# Patient Record
Sex: FEMALE | Race: WHITE | NOT HISPANIC OR LATINO | Employment: UNEMPLOYED | URBAN - METROPOLITAN AREA
[De-identification: names, ages, dates, MRNs, and addresses within clinical notes are randomized per-mention and may not be internally consistent; named-entity substitution may affect disease eponyms.]

---

## 2017-02-04 ENCOUNTER — APPOINTMENT (OUTPATIENT)
Dept: GENERAL RADIOLOGY | Facility: HOSPITAL | Age: 57
End: 2017-02-04

## 2017-02-04 PROCEDURE — 73610 X-RAY EXAM OF ANKLE: CPT | Performed by: FAMILY MEDICINE

## 2018-11-03 ENCOUNTER — HOSPITAL ENCOUNTER (EMERGENCY)
Facility: HOSPITAL | Age: 58
Discharge: HOME | End: 2018-11-04
Attending: EMERGENCY MEDICINE | Admitting: EMERGENCY MEDICINE

## 2018-11-03 DIAGNOSIS — F41.0 PANIC ATTACK: Primary | ICD-10-CM

## 2018-11-03 PROCEDURE — 93005 ELECTROCARDIOGRAM TRACING: CPT | Performed by: EMERGENCY MEDICINE

## 2018-11-03 PROCEDURE — 93010 ELECTROCARDIOGRAM REPORT: CPT | Performed by: INTERNAL MEDICINE

## 2018-11-03 PROCEDURE — 93005 ELECTROCARDIOGRAM TRACING: CPT

## 2018-11-03 PROCEDURE — 99284 EMERGENCY DEPT VISIT MOD MDM: CPT

## 2018-11-03 RX ORDER — SODIUM CHLORIDE 0.9 % (FLUSH) 0.9 %
10 SYRINGE (ML) INJECTION AS NEEDED
Status: DISCONTINUED | OUTPATIENT
Start: 2018-11-03 | End: 2018-11-04 | Stop reason: HOSPADM

## 2018-11-03 RX ORDER — LORAZEPAM 1 MG/1
1 TABLET ORAL ONCE
Status: COMPLETED | OUTPATIENT
Start: 2018-11-03 | End: 2018-11-04

## 2018-11-04 ENCOUNTER — APPOINTMENT (OUTPATIENT)
Dept: GENERAL RADIOLOGY | Facility: HOSPITAL | Age: 58
End: 2018-11-04

## 2018-11-04 VITALS
HEART RATE: 85 BPM | DIASTOLIC BLOOD PRESSURE: 75 MMHG | RESPIRATION RATE: 16 BRPM | OXYGEN SATURATION: 97 % | BODY MASS INDEX: 38.11 KG/M2 | SYSTOLIC BLOOD PRESSURE: 118 MMHG | TEMPERATURE: 97.4 F | HEIGHT: 66 IN | WEIGHT: 237.1 LBS

## 2018-11-04 LAB
ALBUMIN SERPL-MCNC: 4.6 G/DL (ref 3.5–5.2)
ALBUMIN/GLOB SERPL: 1.5 G/DL
ALP SERPL-CCNC: 80 U/L (ref 39–117)
ALT SERPL W P-5'-P-CCNC: 16 U/L (ref 1–33)
ANION GAP SERPL CALCULATED.3IONS-SCNC: 12.4 MMOL/L
AST SERPL-CCNC: 13 U/L (ref 1–32)
BASOPHILS # BLD AUTO: 0.08 10*3/MM3 (ref 0–0.2)
BASOPHILS NFR BLD AUTO: 1 % (ref 0–1.5)
BILIRUB SERPL-MCNC: 0.4 MG/DL (ref 0.1–1.2)
BUN BLD-MCNC: 19 MG/DL (ref 6–20)
BUN/CREAT SERPL: 19.2 (ref 7–25)
CALCIUM SPEC-SCNC: 9.4 MG/DL (ref 8.6–10.5)
CHLORIDE SERPL-SCNC: 103 MMOL/L (ref 98–107)
CO2 SERPL-SCNC: 24.6 MMOL/L (ref 22–29)
CREAT BLD-MCNC: 0.99 MG/DL (ref 0.57–1)
D DIMER PPP FEU-MCNC: <0.27 MCGFEU/ML (ref 0–0.49)
DEPRECATED RDW RBC AUTO: 44.3 FL (ref 37–54)
EOSINOPHIL # BLD AUTO: 0.2 10*3/MM3 (ref 0–0.7)
EOSINOPHIL NFR BLD AUTO: 2.5 % (ref 0.3–6.2)
ERYTHROCYTE [DISTWIDTH] IN BLOOD BY AUTOMATED COUNT: 13.5 % (ref 11.7–13)
GFR SERPL CREATININE-BSD FRML MDRD: 58 ML/MIN/1.73
GLOBULIN UR ELPH-MCNC: 3 GM/DL
GLUCOSE BLD-MCNC: 102 MG/DL (ref 65–99)
HCT VFR BLD AUTO: 41.8 % (ref 35.6–45.5)
HGB BLD-MCNC: 13.8 G/DL (ref 11.9–15.5)
HOLD SPECIMEN: NORMAL
IMM GRANULOCYTES # BLD: 0 10*3/MM3 (ref 0–0.03)
IMM GRANULOCYTES NFR BLD: 0 % (ref 0–0.5)
INR PPP: 1.02 (ref 0.9–1.1)
LYMPHOCYTES # BLD AUTO: 3.5 10*3/MM3 (ref 0.9–4.8)
LYMPHOCYTES NFR BLD AUTO: 43.1 % (ref 19.6–45.3)
MCH RBC QN AUTO: 30 PG (ref 26.9–32)
MCHC RBC AUTO-ENTMCNC: 33 G/DL (ref 32.4–36.3)
MCV RBC AUTO: 90.9 FL (ref 80.5–98.2)
MONOCYTES # BLD AUTO: 0.62 10*3/MM3 (ref 0.2–1.2)
MONOCYTES NFR BLD AUTO: 7.6 % (ref 5–12)
NEUTROPHILS # BLD AUTO: 3.72 10*3/MM3 (ref 1.9–8.1)
NEUTROPHILS NFR BLD AUTO: 45.8 % (ref 42.7–76)
NRBC BLD MANUAL-RTO: 0 /100 WBC (ref 0–0)
PLATELET # BLD AUTO: 315 10*3/MM3 (ref 140–500)
PMV BLD AUTO: 9.4 FL (ref 6–12)
POTASSIUM BLD-SCNC: 4.4 MMOL/L (ref 3.5–5.2)
PROT SERPL-MCNC: 7.6 G/DL (ref 6–8.5)
PROTHROMBIN TIME: 13.2 SECONDS (ref 11.7–14.2)
RBC # BLD AUTO: 4.6 10*6/MM3 (ref 3.9–5.2)
SODIUM BLD-SCNC: 140 MMOL/L (ref 136–145)
TROPONIN T SERPL-MCNC: <0.01 NG/ML (ref 0–0.03)
WBC NRBC COR # BLD: 8.12 10*3/MM3 (ref 4.5–10.7)
WHOLE BLOOD HOLD SPECIMEN: NORMAL

## 2018-11-04 PROCEDURE — 80053 COMPREHEN METABOLIC PANEL: CPT | Performed by: EMERGENCY MEDICINE

## 2018-11-04 PROCEDURE — 85610 PROTHROMBIN TIME: CPT | Performed by: EMERGENCY MEDICINE

## 2018-11-04 PROCEDURE — 85379 FIBRIN DEGRADATION QUANT: CPT | Performed by: EMERGENCY MEDICINE

## 2018-11-04 PROCEDURE — 71046 X-RAY EXAM CHEST 2 VIEWS: CPT

## 2018-11-04 PROCEDURE — 90791 PSYCH DIAGNOSTIC EVALUATION: CPT

## 2018-11-04 PROCEDURE — 84484 ASSAY OF TROPONIN QUANT: CPT | Performed by: EMERGENCY MEDICINE

## 2018-11-04 PROCEDURE — 85025 COMPLETE CBC W/AUTO DIFF WBC: CPT | Performed by: EMERGENCY MEDICINE

## 2018-11-04 RX ADMIN — LORAZEPAM 1 MG: 1 TABLET ORAL at 00:24
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
## 2018-11-04 NOTE — ED PROVIDER NOTES
EMERGENCY DEPARTMENT ENCOUNTER    CHIEF COMPLAINT  Chief Complaint: SOA  History given by: Patient  History limited by: None  Room Number: HALD/D  PMD: Kari Fernandez, APRN      HPI:  Pt is a 58 y.o. female who presents via EMS complaining an episode of SOA which lasted at lasted 30 minutes and occurred PTA. Pt notes she had a virus a few weeks ago and that she recently quit smoking, but she denies having been diagnosed with COPD. Pt also notes hx of anxiety for which she takes Cymbalta and trazodone, that she has been anxious due to recent stress, and that her sx today reminded her of previously experienced anxiety sx.    Duration:  30 minutes  Onset: Sudden  Timing: One episode  Location: N/A  Radiation: N/A  Quality: SOA  Intensity/Severity: Moderate  Progression: Relieved  Associated Symptoms: Anxiety  Aggravating Factors: None specified  Alleviating Factors: None specified  Previous Episodes: Pt also notes hx of anxiety for which she takes Cymbalta and trazodone and that her sx today reminded her of previous anxiety sx.  Treatment before arrival: None    PAST MEDICAL HISTORY  Active Ambulatory Problems     Diagnosis Date Noted   • No Active Ambulatory Problems     Resolved Ambulatory Problems     Diagnosis Date Noted   • No Resolved Ambulatory Problems     Past Medical History:   Diagnosis Date   • Anxiety    • PTSD (post-traumatic stress disorder)        PAST SURGICAL HISTORY  Past Surgical History:   Procedure Laterality Date   • BACK SURGERY         FAMILY HISTORY  Family History   Problem Relation Age of Onset   • Anxiety disorder Mother    • Anxiety disorder Sister    • Anxiety disorder Maternal Grandmother    • Bipolar disorder Son    • Drug abuse Son         heroin    • Bipolar disorder Daughter    • Drug abuse Daughter         heroin   • Bipolar disorder Daughter    • Drug abuse Daughter         heroin       SOCIAL HISTORY  Social History     Social History   • Marital status: Single     Spouse name:  N/A   • Number of children: N/A   • Years of education: N/A     Occupational History   • Not on file.     Social History Main Topics   • Smoking status: Never Smoker   • Smokeless tobacco: Never Used   • Alcohol use No   • Drug use: No   • Sexual activity: Defer     Other Topics Concern   • Not on file     Social History Narrative   • No narrative on file       ALLERGIES  Patient has no known allergies.    REVIEW OF SYSTEMS  Review of Systems   Constitutional: Negative for fever.   HENT: Negative for sore throat.    Eyes: Negative.    Respiratory: Positive for shortness of breath (1 episode). Negative for cough.    Cardiovascular: Negative for chest pain.   Gastrointestinal: Negative for abdominal pain, diarrhea and vomiting.   Genitourinary: Negative for dysuria.   Musculoskeletal: Negative for neck pain.   Skin: Negative for rash.   Allergic/Immunologic: Negative.    Neurological: Negative for weakness, numbness and headaches.   Hematological: Negative.    Psychiatric/Behavioral: The patient is nervous/anxious.    All other systems reviewed and are negative.      PHYSICAL EXAM  ED Triage Vitals   Temp Heart Rate Resp BP SpO2   11/03/18 2120 11/03/18 2120 11/03/18 2120 11/03/18 2120 11/03/18 2120   97.4 °F (36.3 °C) 81 17 120/80 91 %      Temp src Heart Rate Source Patient Position BP Location FiO2 (%)   11/03/18 2120 11/03/18 2322 11/03/18 2322 11/03/18 2322 --   Tympanic Monitor Lying Right arm        Physical Exam   Constitutional: She is oriented to person, place, and time. No distress.   HENT:   Head: Normocephalic and atraumatic.   Eyes: Pupils are equal, round, and reactive to light. EOM are normal.   Neck: Normal range of motion. Neck supple.   Cardiovascular: Normal rate, regular rhythm and normal heart sounds.    Pulmonary/Chest: Effort normal and breath sounds normal. No respiratory distress.   Abdominal: Soft. There is no tenderness. There is no rebound and no guarding.   Musculoskeletal: Normal range  of motion. She exhibits no edema.   Neurological: She is alert and oriented to person, place, and time. She has normal sensation and normal strength.   Skin: Skin is warm and dry. No rash noted.   Psychiatric: Affect normal. Her mood appears anxious (mildly).   Nursing note and vitals reviewed.    LAB RESULTS  Lab Results (last 24 hours)     Procedure Component Value Units Date/Time    CBC & Differential [[id number]] Collected:  11/04/18 0016    Specimen:  Blood Updated:  11/04/18 0037    Narrative:       The following orders were created for panel order CBC & Differential.  Procedure                               Abnormality         Status                     ---------                               -----------         ------                     CBC Auto Differential[[id number]]         Abnormal            Final result                 Please view results for these tests on the individual orders.    Comprehensive Metabolic Panel [[id number]]  (Abnormal) Collected:  11/04/18 0016    Specimen:  Blood Updated:  11/04/18 0046     Glucose 102 (H) mg/dL      BUN 19 mg/dL      Creatinine 0.99 mg/dL      Sodium 140 mmol/L      Potassium 4.4 mmol/L      Chloride 103 mmol/L      CO2 24.6 mmol/L      Calcium 9.4 mg/dL      Total Protein 7.6 g/dL      Albumin 4.60 g/dL      ALT (SGPT) 16 U/L      AST (SGOT) 13 U/L      Alkaline Phosphatase 80 U/L      Total Bilirubin 0.4 mg/dL      eGFR Non African Amer 58 (L) mL/min/1.73      Globulin 3.0 gm/dL      A/G Ratio 1.5 g/dL      BUN/Creatinine Ratio 19.2     Anion Gap 12.4 mmol/L     Protime-INR [[id number]]  (Normal) Collected:  11/04/18 0016    Specimen:  Blood Updated:  11/04/18 0108     Protime 13.2 Seconds      INR 1.02    D-dimer, Quantitative [[id number]]  (Normal) Collected:  11/04/18 0016    Specimen:  Blood Updated:  11/04/18 0115     D-Dimer, Quantitative <0.27 MCGFEU/mL     Narrative:       The Stago D-Dimer test used in conjunction with a clinical pretest probability (PTP)  assessment model, has been approved by the FDA to rule out the presence of venous thromboembolism (VTE) in outpatients suspected of deep venous thrombosis (DVT) or pulmonary embolism (PE).     Troponin [[id number]]  (Normal) Collected:  11/04/18 0016    Specimen:  Blood Updated:  11/04/18 0046     Troponin T <0.010 ng/mL     Narrative:       Troponin T Reference Ranges:  Less than 0.03 ng/mL:    Negative for AMI  0.03 to 0.09 ng/mL:      Indeterminant for AMI  Greater than 0.09 ng/mL: Positive for AMI    CBC Auto Differential [[id number]]  (Abnormal) Collected:  11/04/18 0016    Specimen:  Blood Updated:  11/04/18 0037     WBC 8.12 10*3/mm3      RBC 4.60 10*6/mm3      Hemoglobin 13.8 g/dL      Hematocrit 41.8 %      MCV 90.9 fL      MCH 30.0 pg      MCHC 33.0 g/dL      RDW 13.5 (H) %      RDW-SD 44.3 fl      MPV 9.4 fL      Platelets 315 10*3/mm3      Neutrophil % 45.8 %      Lymphocyte % 43.1 %      Monocyte % 7.6 %      Eosinophil % 2.5 %      Basophil % 1.0 %      Immature Grans % 0.0 %      Neutrophils, Absolute 3.72 10*3/mm3      Lymphocytes, Absolute 3.50 10*3/mm3      Monocytes, Absolute 0.62 10*3/mm3      Eosinophils, Absolute 0.20 10*3/mm3      Basophils, Absolute 0.08 10*3/mm3      Immature Grans, Absolute 0.00 10*3/mm3      nRBC 0.0 /100 WBC         I ordered the above labs and reviewed the results    RADIOLOGY  XR Chest 2 View   Final Result   Left basilar atelectasis versus scarring. No definite acute infiltrates.       This report was finalized on 11/4/2018 12:36 AM by Dr. Carla Almeida M.D.             I ordered the above noted radiological studies. Interpreted by radiologist. Reviewed by me in PACS.     PROCEDURES  Procedures  EKG          EKG time: 2133  Rhythm/Rate: Sinus 76  P waves and HI: NML  QRS, axis: NML   ST and T waves: NML     Interpreted Contemporaneously by me, independently viewed  No old for comparison    PROGRESS AND CONSULTS   2350-Checked patient and discussed plan to order blood  work, EKG, and CXR to rule out pneumonia/heart issue and to order antivan to calm her. Pt understands and agrees with the plan, all questions answered.    2355-ordered Ativan, CXR, blood work, and EKG.    0115-Ordered Access Evaluation.    (DST change)    0121-Discussed pt's case with Access who feels comfortable with discharge and instrcutions to follow up with therapist and PCP. Per Access, pt reports that Xanax has worked in the past for her anxiety.     0145-Rechecked patinet and discussed results of her blood work and EKG which are normal and liklihood of anxiety of the source of her sx tonight. I discussed plan to discharge with instructions to follow up with her PCP. I agreed to write her px for Xanax. Pt understands and agrees with the plan, all questions answered.    MEDICAL DECISION MAKING  Results were reviewed/discussed with the patient and they were also made aware of online access. Pt also made aware that some labs, such as cultures, will not be resulted during ER visit and follow up with PMD is necessary.     MDM  Number of Diagnoses or Management Options  Panic attack:      Amount and/or Complexity of Data Reviewed  Clinical lab tests: ordered and reviewed (Troponin is <0.010, d-dimer<0.27, protime=13.2, INR=1.02)  Tests in the radiology section of CPT®: reviewed and ordered (CXR shows Left basilar atelectasis versus scarring but no definite acute infiltrates)  Tests in the medicine section of CPT®: reviewed and ordered (See EKG procedure note.)    Patient Progress  Patient progress: stable     DIAGNOSIS  Final diagnoses:   Panic attack     DISPOSITION  DISCHARGE    Patient discharged in stable condition.    Reviewed implications of results, diagnosis, meds, responsibility to follow up, warning signs and symptoms of possible worsening, potential complications and reasons to return to ER.    Patient/Family voiced understanding of above instructions.    Discussed plan for discharge, as there is no  emergent indication for admission. Patient referred to primary care provider for BP management due to today's BP. Pt/family is agreeable and understands need for follow up and repeat testing.  Pt is aware that discharge does not mean that nothing is wrong but it indicates no emergency is present that requires admission and they must continue care with follow-up as given below or physician of their choice.     FOLLOW-UP  Kari Fernandez, APRN  [address]  [facility] [address] [phone]    Call            Medication List      No changes were made to your prescriptions during this visit.       Latest Documented Vital Signs:  As of 4:35 AM  BP- 118/75 HR- 85 Temp- 97.4 °F (36.3 °C) (Oral) O2 sat- 97%    --  Documentation assistance provided by janette Strauss for Dr. Riddle.  Information recorded by the scribe was done at my direction and has been verified and validated by me.           Sayra Strauss  11/04/18 0485       Abdelrahman Riddle MD  11/04/18 0508

## 2020-08-12 ENCOUNTER — TELEPHONE (OUTPATIENT)
Dept: FAMILY MEDICINE CLINIC | Facility: CLINIC | Age: 60
End: 2020-08-12

## 2020-08-12 NOTE — TELEPHONE ENCOUNTER
PATIENT WAS SEEN AT [facility] ER ABOUT 3 WEEKS AGO BECAUSE SHE COULD NOT MOVE HER ARMS, MORE IN THE LEFT ARM.      SHE WAS TOLD THAT HER NERVE HOLES ARE CLOSING ON HER SPINE. SHE HAS HAD TWO SURGERIES ON HER RUPTURED DISCS.    PLEASE CALL [phone]    NEEDS NEW PATIENT APPOINTMENT ER FOLLOW UP

## 2020-08-20 ENCOUNTER — OFFICE VISIT (OUTPATIENT)
Dept: FAMILY MEDICINE CLINIC | Facility: CLINIC | Age: 60
End: 2020-08-20

## 2020-08-20 VITALS
HEIGHT: 66 IN | DIASTOLIC BLOOD PRESSURE: 90 MMHG | BODY MASS INDEX: 36.48 KG/M2 | SYSTOLIC BLOOD PRESSURE: 140 MMHG | WEIGHT: 227 LBS | OXYGEN SATURATION: 99 % | HEART RATE: 79 BPM | TEMPERATURE: 98.2 F

## 2020-08-20 DIAGNOSIS — M79.2 CERVICAL SPINE ARTHRITIS WITH NERVE PAIN: Primary | ICD-10-CM

## 2020-08-20 DIAGNOSIS — I10 ESSENTIAL HYPERTENSION: ICD-10-CM

## 2020-08-20 DIAGNOSIS — M47.812 CERVICAL SPINE ARTHRITIS WITH NERVE PAIN: Primary | ICD-10-CM

## 2020-08-20 DIAGNOSIS — M25.512 ACUTE PAIN OF LEFT SHOULDER: ICD-10-CM

## 2020-08-20 PROCEDURE — 99203 OFFICE O/P NEW LOW 30 MIN: CPT | Performed by: INTERNAL MEDICINE

## 2020-08-20 PROCEDURE — 73030 X-RAY EXAM OF SHOULDER: CPT | Performed by: INTERNAL MEDICINE

## 2020-08-20 RX ORDER — LISINOPRIL 20 MG/1
20 TABLET ORAL DAILY
Qty: 90 TABLET | Refills: 1 | Status: SHIPPED | OUTPATIENT
Start: 2020-08-20 | End: 2021-05-13

## 2020-08-20 RX ORDER — PANTOPRAZOLE SODIUM 20 MG/1
20 TABLET, DELAYED RELEASE ORAL DAILY
Qty: 90 TABLET | Refills: 3 | Status: SHIPPED | OUTPATIENT
Start: 2020-08-20 | End: 2021-05-13

## 2020-08-20 RX ORDER — PREDNISONE 10 MG/1
TABLET ORAL DAILY
Qty: 21 TABLET | Refills: 0 | Status: SHIPPED | OUTPATIENT
Start: 2020-08-20 | End: 2020-09-11

## 2020-08-20 NOTE — PROGRESS NOTES
Subjective   Anjana Smith is a 60 y.o. female.   Body mass index is 36.64 kg/m².  Patient here to follow-up from recent ER visit with cervical spine pain with receiving CT of neck obtained.  Increasing pain does have numbness tingling in right arm.  Increased pain with movement was seen at [facility] I do not have those films available also these establish care with physician  History of Present Illness   Increasing pain in the neck and the right shoulder over the last week or 2 no specific injury was seen in [facility] ER with imaging of neck obtain do not have access at this point but pain with persist somewhat limited range of motion of neck as well.  Also is having pain in the shoulder no injury with that does have decreased range of motion we will proceed with x-ray of the shoulder as well there is no history of shoulder injury with patient or frozen shoulder does have a history of hypertension as well blood pressure is 140/90 she had been taking 10 mg lisinopril a day had been missed few days but she says it normally runs about that range anyway we will increase lisinopril to 20 mg have patient call in results in 1 week or so no other specific complaints has had prior neck surgery as well as lumbar surgery  Patient without drug allergies non-smoker.  Family history positive for anxiety disorder bipolar disorder drug abuse..  There is a personal history of PTSD the patient is doing okay now.  Does complain of weakness in the right hand and arm is dropping things.  Review of Systems   Musculoskeletal:        Neck right shoulder right arm pain see present illness   All other systems reviewed and are negative.      Objective   Vitals:    08/20/20 1311   BP: 140/90   Pulse: 79   Temp: 98.2 °F (36.8 °C)   SpO2: 99%   Weight: 103 kg (227 lb)     Physical Exam   Constitutional: She appears well-developed and well-nourished.   HENT:   Head: Normocephalic and atraumatic.   Eyes: Pupils are equal, round, and reactive  to light. Conjunctivae are normal.   Cardiovascular: Normal rate, regular rhythm and normal heart sounds.   Pulmonary/Chest: Effort normal and breath sounds normal.   Abdominal: Soft. Bowel sounds are normal.   Musculoskeletal:   Somewhat limited range of motion of neck turning neck 20 degrees to the left 4 degrees the right tilts head 20 raise left and perhaps 30 degrees the right.  Mild increased pain in the neck into the trapezius on the right with axial loading.    Regarding right shoulder mild discomfort passive range of motion moderately limited active range of motion is able to abduct to 90 degrees only.  Slightly decreased external rotation as well concerned about possible rotator cuff tear versus other.   Neurological: She is alert.   Of extreme reflexes are 1+ patient with diminished  on the right versus left.   Skin: Skin is warm and dry.   Nursing note and vitals reviewed.      Lab Results   Component Value Date    INR 1.0 11/29/2019    INR 1.0 11/29/2019    INR 1.0 08/09/2019       Procedures  X-ray right shoulder shows no acute bony or soft tissue normalities there is no comparison available.  Basically unremarkable right shoulder x-ray  Assessment/Plan   1.  Cervical spine pain suspected nerve impingement at C-spine level we will refer to neurosurgery for this trying to get the CT of the neck report from [facility]    2.  Right shoulder pain concern for possible rotator cuff tear versus other disc of x-ray to go with patient NSAIDs also regarding neck we will do prednisone 60-10 to give her some relief.    3.  Hypertension suboptimally controlled increase lisinopril to 20 g daily follow-up in 6 months call readings in 1 week's time and as needed in the interim.  Will await report from hospital in case anything else lab work consider needs to be done.      : Blood pressure readings 1 week continue on lisinopril 20 mg daily also follow-up in 6 months time and as needed   Much of this encounter  note is an electronic transcription/translation of spoken language to printed text.  The electronic translation of spoken language may permit erroneous, or at times, nonsensical words or phrases to be inadvertently transcribed.  Although I have reviewed the note for such errors, some may still exist. If there are questions or for further clarification, please contact me.

## 2020-08-28 ENCOUNTER — TELEPHONE (OUTPATIENT)
Dept: FAMILY MEDICINE CLINIC | Facility: CLINIC | Age: 60
End: 2020-08-28

## 2020-09-11 ENCOUNTER — OFFICE VISIT (OUTPATIENT)
Dept: FAMILY MEDICINE CLINIC | Facility: CLINIC | Age: 60
End: 2020-09-11

## 2020-09-11 VITALS
RESPIRATION RATE: 20 BRPM | DIASTOLIC BLOOD PRESSURE: 80 MMHG | TEMPERATURE: 98.9 F | HEIGHT: 66 IN | HEART RATE: 86 BPM | WEIGHT: 220 LBS | OXYGEN SATURATION: 99 % | SYSTOLIC BLOOD PRESSURE: 140 MMHG | BODY MASS INDEX: 35.36 KG/M2

## 2020-09-11 DIAGNOSIS — M25.561 ACUTE PAIN OF RIGHT KNEE: Primary | ICD-10-CM

## 2020-09-11 DIAGNOSIS — M54.2 CERVICAL PAIN (NECK): ICD-10-CM

## 2020-09-11 DIAGNOSIS — F41.9 ANXIETY: ICD-10-CM

## 2020-09-11 DIAGNOSIS — Z51.81 MEDICATION MONITORING ENCOUNTER: ICD-10-CM

## 2020-09-11 DIAGNOSIS — F32.A DEPRESSION, UNSPECIFIED DEPRESSION TYPE: ICD-10-CM

## 2020-09-11 PROCEDURE — 99213 OFFICE O/P EST LOW 20 MIN: CPT | Performed by: INTERNAL MEDICINE

## 2020-09-11 RX ORDER — VENLAFAXINE HYDROCHLORIDE 37.5 MG/1
CAPSULE, EXTENDED RELEASE ORAL
Qty: 60 CAPSULE | Refills: 0 | Status: SHIPPED | OUTPATIENT
Start: 2020-09-11 | End: 2021-05-13

## 2020-09-11 NOTE — PROGRESS NOTES
Subjective   Anjana Smith is a 60 y.o. female.  Increased knee pain ongoing cervical disc pain increased anxiety due to hospitalized for member among other stressors  Body mass index is 35.51 kg/m².  History of Present Illness   Patient requests gabapentin which is been effective for her cervical disc disease in the past she also would like to see pain management specifically Dr. Viraj Joseph who is seen family before no injury to need to start acting up 2 weeks ago with locking and giving a lot of pain to be going up and down steps.  But will just suddenly give suddenly from walking no thoughts of hurting self hurting others.   Review of Systems   Musculoskeletal: Positive for neck pain.   All other systems reviewed and are negative.      Objective   Vitals:    09/11/20 1405   BP: 140/80   Pulse: 86   Resp: 20   Temp: 98.9 °F (37.2 °C)   SpO2: 99%   Weight: 99.8 kg (220 lb)     Physical Exam   Constitutional: She appears well-developed and well-nourished.   HENT:   Head: Normocephalic and atraumatic.   Cardiovascular: Normal rate, regular rhythm and normal heart sounds.   Pulmonary/Chest: Effort normal and breath sounds normal.   Musculoskeletal:   Right knee with mild effusion.  Right knee is normal stressing AP lateral medial motion.  Has pain with stressing medial collateral ligament but no laxity noted minimal discomfort with doing stressing knee and AP direction.  Moderate pain with external and to lesser extent internal rotation lower leg extension immediate pain with outward turning the leg over the lateral portion of her knee.   Neurological: She is alert.   Unremarkable gait and station   Skin: Skin is warm and dry.   Psychiatric: She has a normal mood and affect. Her behavior is normal.   Patient seems somewhat serious but no tearing overall appropriate in behavior   Nursing note and vitals reviewed.      Lab Results   Component Value Date    INR 1.0 11/29/2019    INR 1.0 11/29/2019    INR 1.0  08/09/2019       Procedures    Assessment/Plan Patient had to be leave suddenly due to family member who was hospitalized will return on Monday  [facility] drug contract for probable gabapentin  There are no diagnoses linked to this encounter.     1.  Right knee pain plan follow-up will get x-ray on return    2.  Cervical disc requesting gabapentin for same    3.  Medication monitoring will need urine drug screen and consideration of gabapentin    4.  Anxiety    5.  Depression both these were again been Effexor Exar 37.51 tablet daily for 1 week then 2 tablets daily    Follow-up Monday    Much of this encounter note is an electronic transcription/translation of spoken language to printed text.  The electronic translation of spoken language may permit erroneous, or at times, nonsensical words or phrases to be inadvertently transcribed.  Although I have reviewed the note for such errors, some may still exist. If there are questions or for further clarification, please contact me.

## 2020-09-11 NOTE — TELEPHONE ENCOUNTER
Patient was here today briefly discussed patient had to leave due to family emergency.  Do to return Monday.

## 2020-09-15 ENCOUNTER — OFFICE VISIT (OUTPATIENT)
Dept: FAMILY MEDICINE CLINIC | Facility: CLINIC | Age: 60
End: 2020-09-15

## 2020-09-15 ENCOUNTER — TELEPHONE (OUTPATIENT)
Dept: FAMILY MEDICINE CLINIC | Facility: CLINIC | Age: 60
End: 2020-09-15

## 2020-09-15 VITALS
HEIGHT: 66 IN | OXYGEN SATURATION: 98 % | BODY MASS INDEX: 35.36 KG/M2 | HEART RATE: 101 BPM | WEIGHT: 220 LBS | DIASTOLIC BLOOD PRESSURE: 80 MMHG | SYSTOLIC BLOOD PRESSURE: 138 MMHG | TEMPERATURE: 98.7 F

## 2020-09-15 DIAGNOSIS — Z51.81 MEDICATION MONITORING ENCOUNTER: ICD-10-CM

## 2020-09-15 DIAGNOSIS — M25.562 ACUTE PAIN OF LEFT KNEE: Primary | ICD-10-CM

## 2020-09-15 DIAGNOSIS — M54.2 CERVICAL PAIN (NECK): ICD-10-CM

## 2020-09-15 PROCEDURE — 99213 OFFICE O/P EST LOW 20 MIN: CPT | Performed by: INTERNAL MEDICINE

## 2020-09-15 PROCEDURE — 73560 X-RAY EXAM OF KNEE 1 OR 2: CPT | Performed by: INTERNAL MEDICINE

## 2020-09-15 RX ORDER — GABAPENTIN 300 MG/1
300 CAPSULE ORAL 3 TIMES DAILY
Qty: 90 CAPSULE | Refills: 0 | Status: SHIPPED | OUTPATIENT
Start: 2020-09-15 | End: 2020-11-17 | Stop reason: SDUPTHER

## 2020-09-15 RX ORDER — VENLAFAXINE HYDROCHLORIDE 75 MG/1
CAPSULE, EXTENDED RELEASE ORAL
Qty: 30 CAPSULE | Refills: 0 | Status: SHIPPED | OUTPATIENT
Start: 2020-09-15 | End: 2021-05-13

## 2020-09-15 RX ORDER — VENLAFAXINE HYDROCHLORIDE 37.5 MG/1
37.5 CAPSULE, EXTENDED RELEASE ORAL DAILY
Qty: 7 CAPSULE | Refills: 0 | Status: SHIPPED | OUTPATIENT
Start: 2020-09-15 | End: 2021-05-13

## 2020-09-15 NOTE — TELEPHONE ENCOUNTER
Can you change rx for venlafaxine to   37.5 qd # 7 and 75 qd #30  Insurance won't pay for 37.5 2 qd

## 2020-09-15 NOTE — PROGRESS NOTES
Subjective   Anjana Smith is a 60 y.o. female.   Body mass index is 35.51 kg/m².  Patient here with ongoing right knee pain here for follow-up on same.  Did need to get x-ray obtained.  Had to leave due to hospitalized family member issue last visit.  History of Present Illness   Still with right knee discomfort not quite as bad.  He is still somewhat problematic overall is doing fair patient does request gabapentin for cervical disc pain which cause referred pain particular in the arch more so left arm we will need to get a urine drug screen on her also drug contract for gabapentin which she has had before she wishes referral to Dr. Flash Cali pain management.  Which we will initiate given the right knee issues and worry about meniscal type problem will refer to Dr. Patricia Holguin for that.  Weight pending urine blood pressure satisfactory today.  Apparently there is an issue with her Effexor probably problem relation issue otherwise she talks will try to ferret that out    Review of Systems   All other systems reviewed and are negative.      Objective   Vitals:    09/15/20 1113   BP: 138/80   Pulse: 101   Temp: 98.7 °F (37.1 °C)   SpO2: 98%   Weight: 99.8 kg (220 lb)     Physical Exam  Vitals signs and nursing note reviewed.   Constitutional:       Appearance: Normal appearance.   HENT:      Head: Normocephalic and atraumatic.   Eyes:      Conjunctiva/sclera: Conjunctivae normal.      Pupils: Pupils are equal, round, and reactive to light.   Cardiovascular:      Rate and Rhythm: Normal rate and regular rhythm.      Heart sounds: Normal heart sounds.   Pulmonary:      Effort: Pulmonary effort is normal.      Breath sounds: Normal breath sounds.   Abdominal:      General: Abdomen is flat. Bowel sounds are normal.   Musculoskeletal:      Comments: Right knee with mild effusion.  No focal tenderness palpation.  Did have discomfort with stress of being AP lateral medial motion.  Also with internal/external  rotation of the lower leg.   Skin:     General: Skin is warm and dry.   Neurological:      Mental Status: She is alert.         Lab Results   Component Value Date    INR 1.0 11/29/2019    INR 1.0 11/29/2019    INR 1.0 08/09/2019       Procedures  PA and lateral x-ray obtained right knee.  No comparison available.  Patient does have evidence of fusion knee seen on lateral view.  Mild narrowing of the medial meniscus as well.  No comparison available.  No other bony or soft tissue m abnormalities noted.  Assessment/Plan     Anjana was seen today for knee pain.    Diagnoses and all orders for this visit:    Acute pain of left knee  -     XR Knee 1 or 2 View Left    Cervical pain (neck)  -     Compliance Drug Analysis, Ur - Urine, Clean Catch    Medication monitoring encounter  -     Compliance Drug Analysis, Ur - Urine, Clean Catch    Per contract Michael not done urine drug screen obtained.  Refer pain management Dr. Viraj Joseph regarding cervical disc first Dr. Patricia Holguin regarding knee     Gabapentin 300 mg 3 times daily quantity 90 sent through for patient should begin initially just 300 mg nightly for couple days with 3 days ago to 300 mg twice daily for to 3 days ago to 300 mg 3 times daily.    Much of this encounter note is an electronic transcription/translation of spoken language to printed text.  The electronic translation of spoken language may permit erroneous, or at times, nonsensical words or phrases to be inadvertently transcribed.  Although I have reviewed the note for such errors, some may still exist. If there are questions or for further clarification, please contact me.

## 2020-09-19 LAB — DRUGS UR: NORMAL

## 2020-09-24 ENCOUNTER — TELEPHONE (OUTPATIENT)
Dept: FAMILY MEDICINE CLINIC | Facility: CLINIC | Age: 60
End: 2020-09-24

## 2020-09-24 NOTE — TELEPHONE ENCOUNTER
Patient called today to see if there were any updates on her referral to see Dr. Viraj Joseph for pain management (see 09/11/2020 and 09/15/2020 Referrals). Patient stated that she had called this provider's office yesterday and that they said that they did not have her in their system. Patient would just like an update from Dr. Bonilla or his MA on where this is in the process.    Please call and advise.  [phone]

## 2020-09-25 NOTE — TELEPHONE ENCOUNTER
HUB CAN TELL PATIENT;    TRIED TO RETURN PT'S CALL, NO ANSWER AND VM NOT SET UP. REFERRAL FOR DR. CASTAÑEDA-PAIN MANAGEMENT FAXED TO HIS OFFICE. THEIR OFFICE WILL CONTACT PT TO SCHEDULE APPT.

## 2020-09-29 NOTE — TELEPHONE ENCOUNTER
Patient called back in to let the nurse know that Dr Viraj Joseph does not accept [employer]. Please refer to another doctor.    Best call back # [phone]

## 2020-10-15 ENCOUNTER — TELEPHONE (OUTPATIENT)
Dept: FAMILY MEDICINE CLINIC | Facility: CLINIC | Age: 60
End: 2020-10-15

## 2020-11-11 NOTE — TELEPHONE ENCOUNTER
PATIENT CALLED AND STATED THAT DR. ZUNIGA HAD REFERRED HER TO PAIN MANAGEMENT AND ORTHOPEDICS.     THE PATIENT HAS NOT RECEIVED INFORMATION ON THE STATUS OF THE PAIN MANAGEMENT REFERRAL.     THE PATIENT HAD AN APPOINTMENT WITH THE ORTHOPEDIC DOCTOR BUT WAS UNABLE TO KEEP IT, NEEDS TO BE RESCHEDULED.     THE PATIENT WOULD LIKE A CALLBACK WITH FURTHER INSTRUCTION.     THE PATIENT CAN BE REACHED .[phone]

## 2020-11-17 ENCOUNTER — TELEPHONE (OUTPATIENT)
Dept: FAMILY MEDICINE CLINIC | Facility: CLINIC | Age: 60
End: 2020-11-17

## 2020-11-17 DIAGNOSIS — M25.562 ACUTE PAIN OF LEFT KNEE: Primary | ICD-10-CM

## 2020-11-17 DIAGNOSIS — I10 ESSENTIAL HYPERTENSION: ICD-10-CM

## 2020-11-17 DIAGNOSIS — Z51.81 MEDICATION MONITORING ENCOUNTER: Primary | ICD-10-CM

## 2020-11-17 DIAGNOSIS — M54.2 CERVICAL PAIN (NECK): ICD-10-CM

## 2020-11-17 RX ORDER — GABAPENTIN 300 MG/1
300 CAPSULE ORAL 3 TIMES DAILY
Qty: 90 CAPSULE | Refills: 0 | Status: SHIPPED | OUTPATIENT
Start: 2020-11-17 | End: 2021-05-13

## 2020-11-17 NOTE — TELEPHONE ENCOUNTER
Caller: Anjana Smith    Relationship: Self    Best call back number:[phone]    Medication needed:   gabapentin (NEURONTIN) 300 MG capsule  300 mg, 3 Times Daily         When do you need the refill by: 11/17/20    What details did the patient provide when requesting the medication: COMPLETELY OUT  Does the patient have less than a 3 day supply:  [x] Yes  [] No    What is the patient's preferred pharmacy: [facility] [address] & [place] - [phone] [facility] [phone] FX

## 2020-11-17 NOTE — TELEPHONE ENCOUNTER
Patient needs a new ref for [facility]. I believe for left knee pain. Called pt back to verify. Also needs refill on gabapentin.

## 2020-11-17 NOTE — TELEPHONE ENCOUNTER
So [facility] takes her insurance is that  gist of this message and we can refer to them?

## 2020-11-17 NOTE — TELEPHONE ENCOUNTER
PATIENT CALLED IN AND STATED THAT SHE HAS CALLED AND SPOKEN TO BOTH PLACES FOR THE REFERRAL, THE PAIN MANAGEMENT IS NOT ACCEPTING NEW PATIENTS AT THIS TIME. THE ORTHO DOES NOT TAKE PASSPORT.     PLEASE ADVISE    CALL BACK [phone]

## 2020-11-30 ENCOUNTER — TELEPHONE (OUTPATIENT)
Dept: FAMILY MEDICINE CLINIC | Facility: CLINIC | Age: 60
End: 2020-11-30

## 2020-11-30 NOTE — TELEPHONE ENCOUNTER
[facility] TAKES PASSPORT AND NEW PTS    NUM [phone]

## 2020-11-30 NOTE — TELEPHONE ENCOUNTER
Patient called checking on the status of referral to an orthopedist and pain management     Please advise   [phone]

## 2020-12-04 NOTE — PROGRESS NOTES
Subjective   History of Present Illness: Anjana Smith is a 60 y.o. female is being seen for consultation today at the request of Donnell Bonilla Jr., MD for 2ND Opinion, cervical spine arthritis with nerve pain.    Patient has been referred to pain management. Patient had CT cervical spine 06.29.2020 at [facility]. Patient had a neck fusion 3 years ago, and low back surgery by Dr. Mariano in 2000.    Patient reports today that she has neck that radiates down bilateral arms. Patient reports numbness in fingers in bilateral hands. Patient reports that her R arm hurts worse than her L arm.    Patient reports that she has gait and balance issues.    Patient is currently taking Ibuprofen OTC for pain.    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 About 5 years ago she had been having pain in her neck and left arm for about a year and having failed physical therapy and block she underwent an ACDF at C5-C6 by Dr. Hyatt. She states that the pain was helped somewhat but she continued to have persistent left arm pain, not as bad as before. About a year later she began developing contralateral pain in the right and it has bothered her ever since, having gotten worse in the last 6 months. In 2018 she saw Dr. Gresham but is no longer seeing him. She has not had a recent cervical MRI but she di have a CT scan in 06/2020 which showed some osteophytic changes above her fusion at C3-C4 and C4-C5, which could be related. She also has tenderness in both shoulders, particularly the right, to palpation and some pain with external rotation which suggests there may be some superimposed shoulder problems. I told her we need to update some imaging so we will go ahead and get a cervical MRI, flexion extension films and x-rays of the shoulders and have her come back to discuss what needs to be done.       Neck Pain   This is a recurrent problem. The problem occurs daily. The problem has been gradually  "worsening. Quality: throbbing. The pain is at a severity of 7/10. The symptoms are aggravated by sneezing and bending. The pain is same all the time. Stiffness is present all day. Associated symptoms include headaches, numbness, tingling, trouble swallowing and weakness. Pertinent negatives include no fever, pain with swallowing, photophobia or visual change. Associated symptoms comments: B/L arms N/T. The treatment provided no relief.   Arm Pain   There was no injury mechanism. The pain is present in the left shoulder, right shoulder, right hand, right forearm, upper left arm, upper right arm, right clavicle, right elbow, right fingers, left forearm, left fingers, left elbow, left clavicle, left hand, left wrist and right wrist. Quality: Throbbing. The pain radiates to the left arm and right arm. The pain is at a severity of 7/10. The pain is moderate. The pain has been constant since the incident. Associated symptoms include numbness and tingling. The treatment provided no relief.       The following portions of the patient's history were reviewed and updated as appropriate: allergies, current medications, past family history, past medical history, past social history, past surgical history and problem list.    Review of Systems   Constitutional: Negative for chills and fever.   HENT: Positive for trouble swallowing. Negative for congestion.    Eyes: Negative for photophobia.   Genitourinary: Negative for difficulty urinating and dysuria.   Musculoskeletal: Positive for back pain, gait problem, neck pain and neck stiffness.   Neurological: Positive for tingling, weakness, light-headedness, numbness and headaches.   All other systems reviewed and are negative.          Objective     Vitals:    12/07/20 1009   BP: 150/97   Cuff Size: Adult   Pulse: 88   Temp: 98.9 °F (37.2 °C)   Weight: 102 kg (225 lb)   Height: 167.6 cm (66\")     Body mass index is 36.32 kg/m².      Physical Exam  Constitutional:       Appearance: " She is well-developed.   HENT:      Head: Normocephalic and atraumatic.   Eyes:      Extraocular Movements: EOM normal.      Conjunctiva/sclera: Conjunctivae normal.      Pupils: Pupils are equal, round, and reactive to light.      Funduscopic exam:     Right eye: No papilledema.         Left eye: No papilledema.   Neck:      Vascular: No carotid bruit.   Neurological:      Mental Status: She is oriented to person, place, and time.      Coordination: Finger-Nose-Finger Test and Heel to Shin Test normal.      Gait: Gait is intact.      Deep Tendon Reflexes:      Reflex Scores:       Tricep reflexes are 2+ on the right side and 2+ on the left side.       Bicep reflexes are 2+ on the right side and 2+ on the left side.       Brachioradialis reflexes are 2+ on the right side and 2+ on the left side.       Patellar reflexes are 2+ on the right side and 2+ on the left side.       Achilles reflexes are 2+ on the right side and 2+ on the left side.  Psychiatric:         Speech: Speech normal.       Neurologic Exam     Mental Status   Oriented to person, place, and time.   Registration of memory: Good recent and remote memory.   Attention: normal. Concentration: normal.   Speech: speech is normal   Level of consciousness: alert  Knowledge: consistent with education.     Cranial Nerves     CN II   Visual fields full to confrontation.   Visual acuity: normal    CN III, IV, VI   Pupils are equal, round, and reactive to light.  Extraocular motions are normal.     CN V   Facial sensation intact.   Right corneal reflex: normal  Left corneal reflex: normal    CN VII   Facial expression full, symmetric.   Right facial weakness: none  Left facial weakness: none    CN VIII   Hearing: intact    CN IX, X   Palate: symmetric    CN XI   Right sternocleidomastoid strength: normal  Left sternocleidomastoid strength: normal    CN XII   Tongue: not atrophic  Tongue deviation: none    Motor Exam   Muscle bulk: normal  Right arm tone:  normal  Left arm tone: normal  Right leg tone: normal  Left leg tone: normal    Strength   Strength 5/5 except as noted.     Sensory Exam   Light touch normal.     Gait, Coordination, and Reflexes     Gait  Gait: normal    Coordination   Finger to nose coordination: normal  Heel to shin coordination: normal    Reflexes   Right brachioradialis: 2+  Left brachioradialis: 2+  Right biceps: 2+  Left biceps: 2+  Right triceps: 2+  Left triceps: 2+  Right patellar: 2+  Left patellar: 2+  Right achilles: 2+  Left achilles: 2+  Right : 2+  Left : 2+          Assessment/Plan   Independent Review of Radiographic Studies:      I personally reviewed the images from the following studies.    I reviewed a plain CT scan done on 6/29/2020 which shows a fusion at C5-C6 and some foraminal stenosis bilaterally at adjacent levels particularly C4-C5 and C3-C4.  Agree with the report.        Medical Decision Making:      We will go ahead and get some updated imaging with an MRI of the cervical spine flexion-extension x-rays and x-rays of the shoulders and have her come back to discuss it.      Diagnoses and all orders for this visit:    1. Cervical disc disorder at C4-C5 level with radiculopathy (Primary)  -     XR spine cervical ap and lat w flex and ext; Future  -     MRI Cervical Spine With & Without Contrast; Future  -     XR shoulder 2+ vw left; Future  -     XR shoulder 2+ vw right; Future    2. Cervical disc disorder at C6-C7 level with radiculopathy  -     XR spine cervical ap and lat w flex and ext; Future  -     MRI Cervical Spine With & Without Contrast; Future  -     XR shoulder 2+ vw left; Future  -     XR shoulder 2+ vw right; Future    3. Chronic pain of both shoulders  -     XR spine cervical ap and lat w flex and ext; Future  -     MRI Cervical Spine With & Without Contrast; Future  -     XR shoulder 2+ vw left; Future  -     XR shoulder 2+ vw right; Future      Return in about 2 weeks (around 12/21/2020) for  After tests.

## 2020-12-07 ENCOUNTER — OFFICE VISIT (OUTPATIENT)
Dept: NEUROSURGERY | Facility: CLINIC | Age: 60
End: 2020-12-07

## 2020-12-07 VITALS
BODY MASS INDEX: 36.16 KG/M2 | HEIGHT: 66 IN | HEART RATE: 88 BPM | WEIGHT: 225 LBS | DIASTOLIC BLOOD PRESSURE: 97 MMHG | TEMPERATURE: 98.9 F | SYSTOLIC BLOOD PRESSURE: 150 MMHG

## 2020-12-07 DIAGNOSIS — M50.123 CERVICAL DISC DISORDER AT C6-C7 LEVEL WITH RADICULOPATHY: ICD-10-CM

## 2020-12-07 DIAGNOSIS — G89.29 CHRONIC PAIN OF BOTH SHOULDERS: ICD-10-CM

## 2020-12-07 DIAGNOSIS — M25.512 CHRONIC PAIN OF BOTH SHOULDERS: ICD-10-CM

## 2020-12-07 DIAGNOSIS — M50.121 CERVICAL DISC DISORDER AT C4-C5 LEVEL WITH RADICULOPATHY: Primary | ICD-10-CM

## 2020-12-07 DIAGNOSIS — M25.511 CHRONIC PAIN OF BOTH SHOULDERS: ICD-10-CM

## 2020-12-07 PROCEDURE — 99244 OFF/OP CNSLTJ NEW/EST MOD 40: CPT | Performed by: NEUROLOGICAL SURGERY

## 2020-12-07 RX ORDER — ONDANSETRON 4 MG/1
TABLET, FILM COATED ORAL
COMMUNITY
Start: 2020-09-29 | End: 2021-05-13

## 2020-12-21 ENCOUNTER — OFFICE VISIT (OUTPATIENT)
Dept: ORTHOPEDIC SURGERY | Facility: CLINIC | Age: 60
End: 2020-12-21

## 2020-12-21 VITALS — HEIGHT: 66 IN | TEMPERATURE: 98.2 F | BODY MASS INDEX: 35.36 KG/M2 | WEIGHT: 220 LBS

## 2020-12-21 DIAGNOSIS — M25.562 LEFT KNEE PAIN, UNSPECIFIED CHRONICITY: ICD-10-CM

## 2020-12-21 DIAGNOSIS — M17.0 PRIMARY OSTEOARTHRITIS OF BOTH KNEES: Primary | ICD-10-CM

## 2020-12-21 DIAGNOSIS — M25.561 RIGHT KNEE PAIN, UNSPECIFIED CHRONICITY: ICD-10-CM

## 2020-12-21 PROCEDURE — 99203 OFFICE O/P NEW LOW 30 MIN: CPT | Performed by: ORTHOPAEDIC SURGERY

## 2020-12-21 PROCEDURE — 72170 X-RAY EXAM OF PELVIS: CPT | Performed by: ORTHOPAEDIC SURGERY

## 2020-12-21 PROCEDURE — 20610 DRAIN/INJ JOINT/BURSA W/O US: CPT | Performed by: ORTHOPAEDIC SURGERY

## 2020-12-21 PROCEDURE — 73564 X-RAY EXAM KNEE 4 OR MORE: CPT | Performed by: ORTHOPAEDIC SURGERY

## 2020-12-21 RX ORDER — METHYLPREDNISOLONE ACETATE 80 MG/ML
80 INJECTION, SUSPENSION INTRA-ARTICULAR; INTRALESIONAL; INTRAMUSCULAR; SOFT TISSUE
Status: COMPLETED | OUTPATIENT
Start: 2020-12-21 | End: 2020-12-21

## 2020-12-21 RX ADMIN — METHYLPREDNISOLONE ACETATE 80 MG: 80 INJECTION, SUSPENSION INTRA-ARTICULAR; INTRALESIONAL; INTRAMUSCULAR; SOFT TISSUE at 15:05
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
## 2021-02-01 ENCOUNTER — APPOINTMENT (OUTPATIENT)
Dept: MRI IMAGING | Facility: HOSPITAL | Age: 61
End: 2021-02-01

## 2021-05-13 ENCOUNTER — OFFICE VISIT (OUTPATIENT)
Dept: INTERNAL MEDICINE | Facility: CLINIC | Age: 61
End: 2021-05-13

## 2021-05-13 VITALS
TEMPERATURE: 98.6 F | HEART RATE: 99 BPM | WEIGHT: 235 LBS | DIASTOLIC BLOOD PRESSURE: 78 MMHG | OXYGEN SATURATION: 98 % | BODY MASS INDEX: 37.77 KG/M2 | SYSTOLIC BLOOD PRESSURE: 132 MMHG | HEIGHT: 66 IN

## 2021-05-13 DIAGNOSIS — Z87.11 HISTORY OF GASTRIC ULCER: ICD-10-CM

## 2021-05-13 DIAGNOSIS — M54.9 CHRONIC BACK PAIN, UNSPECIFIED BACK LOCATION, UNSPECIFIED BACK PAIN LATERALITY: Primary | ICD-10-CM

## 2021-05-13 DIAGNOSIS — F17.200 CURRENT EVERY DAY SMOKER: ICD-10-CM

## 2021-05-13 DIAGNOSIS — G89.29 CHRONIC BACK PAIN, UNSPECIFIED BACK LOCATION, UNSPECIFIED BACK PAIN LATERALITY: Primary | ICD-10-CM

## 2021-05-13 DIAGNOSIS — F32.A ANXIETY AND DEPRESSION: ICD-10-CM

## 2021-05-13 DIAGNOSIS — Z72.0 TOBACCO USE: ICD-10-CM

## 2021-05-13 DIAGNOSIS — F17.210 CIGARETTE NICOTINE DEPENDENCE WITHOUT COMPLICATION: ICD-10-CM

## 2021-05-13 DIAGNOSIS — F41.9 ANXIETY AND DEPRESSION: ICD-10-CM

## 2021-05-13 DIAGNOSIS — Z12.31 VISIT FOR SCREENING MAMMOGRAM: ICD-10-CM

## 2021-05-13 DIAGNOSIS — Z00.00 HEALTH CARE MAINTENANCE: ICD-10-CM

## 2021-05-13 PROBLEM — Z79.899 ON LONG TERM DRUG THERAPY: Status: ACTIVE | Noted: 2019-07-08

## 2021-05-13 PROBLEM — T50.901A ACUTE DRUG OVERDOSE: Status: ACTIVE | Noted: 2019-11-29

## 2021-05-13 PROBLEM — R55 SYNCOPE AND COLLAPSE: Status: ACTIVE | Noted: 2018-12-01

## 2021-05-13 PROBLEM — J44.1 CHRONIC OBSTRUCTIVE PULMONARY DISEASE WITH ACUTE EXACERBATION: Status: ACTIVE | Noted: 2019-07-25

## 2021-05-13 PROBLEM — M54.50 LOW BACK PAIN: Status: ACTIVE | Noted: 2018-04-13

## 2021-05-13 PROBLEM — I10 ESSENTIAL HYPERTENSION: Status: ACTIVE | Noted: 2019-07-08

## 2021-05-13 PROBLEM — M47.22 OSTEOARTHRITIS OF SPINE WITH RADICULOPATHY, CERVICAL REGION: Status: ACTIVE | Noted: 2017-06-08

## 2021-05-13 PROBLEM — E83.42 HYPOMAGNESEMIA: Status: ACTIVE | Noted: 2019-11-30

## 2021-05-13 PROCEDURE — 99214 OFFICE O/P EST MOD 30 MIN: CPT | Performed by: NURSE PRACTITIONER

## 2021-05-13 RX ORDER — PANTOPRAZOLE SODIUM 40 MG/1
40 TABLET, DELAYED RELEASE ORAL DAILY
Qty: 90 TABLET | Refills: 1 | Status: SHIPPED | OUTPATIENT
Start: 2021-05-13 | End: 2023-02-28

## 2021-05-13 RX ORDER — HYDROXYZINE HYDROCHLORIDE 25 MG/1
25 TABLET, FILM COATED ORAL 3 TIMES DAILY PRN
Qty: 60 TABLET | Refills: 2 | Status: SHIPPED | OUTPATIENT
Start: 2021-05-13 | End: 2023-02-28

## 2021-05-13 RX ORDER — ESCITALOPRAM OXALATE 10 MG/1
10 TABLET ORAL DAILY
Qty: 30 TABLET | Refills: 6 | Status: SHIPPED | OUTPATIENT
Start: 2021-05-13 | End: 2023-02-28

## 2021-05-13 RX ORDER — HYDROCODONE BITARTRATE AND ACETAMINOPHEN 5; 325 MG/1; MG/1
1 TABLET ORAL
COMMUNITY
End: 2023-02-28

## 2021-05-13 NOTE — PROGRESS NOTES
Subjective   Anjana Smith is a 61 y.o. female.     History of Present Illness   The patient is here today for CPE and lab work F/U. She is struggling.    Chronic back pain- has had 2 spine sx in the past. Sees Dr. Salmon, takes hydrocodone 1/2 tab PRN.     Used to take BP meds but does not need currently. BP runs 120/80s at home.     C-scope- a few years ago. Used to have bleeding ulcers and have had them cauterized. Has not been back to GI since then.     Her son hung himself easter weekend. He had meth and fentanyl in his system as well. He did have a .   She is also a . Mom  2 weeks later, this was last 2020.   She has taken effexor last September but stopped it because she felt it didn't work.   She has had frequent anxiety attacks in the last 3 years. She used to take xanax. She has anxiety attacks so bad that she feels like she cant breath. She has had 3 in the last few weeks. Has taken buspar with out relief.   She is planning on seeing her daughter's therapist.   Her son is 17, she has custody of 3 grandchildren. She now also has her DIL and her 3 children.   She has 6 kids.   The following portions of the patient's history were reviewed and updated as appropriate: allergies, current medications, past family history, past medical history, past social history, past surgical history and problem list.    Review of Systems   Constitutional: Negative for chills and fever.   Respiratory: Negative.    Cardiovascular: Negative.    Psychiatric/Behavioral: Positive for depressed mood and stress. Negative for dysphoric mood and suicidal ideas. The patient is nervous/anxious.        Objective   Physical Exam  Constitutional:       Appearance: Normal appearance. She is well-developed.   Neck:      Thyroid: No thyromegaly.   Cardiovascular:      Rate and Rhythm: Normal rate and regular rhythm.      Heart sounds: Normal heart sounds.   Pulmonary:      Effort: Pulmonary effort is normal.       Breath sounds: Normal breath sounds.   Musculoskeletal:      Cervical back: Normal range of motion and neck supple.   Lymphadenopathy:      Cervical: No cervical adenopathy.   Skin:     General: Skin is warm and dry.   Neurological:      Mental Status: She is alert.   Psychiatric:         Mood and Affect: Affect is tearful.         Behavior: Behavior normal.         Thought Content: Thought content normal.         Judgment: Judgment normal.         Vitals:    05/13/21 1233   BP: 132/78   Pulse: 99   Temp: 98.6 °F (37 °C)   SpO2: 98%     Body mass index is 37.95 kg/m².      Assessment/Plan   Diagnoses and all orders for this visit:    1. Chronic back pain, unspecified back location, unspecified back pain laterality (Primary)  -     CBC & Differential  -     Comprehensive Metabolic Panel  -     Lipid Panel With LDL / HDL Ratio  -     TSH Rfx On Abnormal To Free T4  -     Vitamin D 25 Hydroxy  -     Hepatitis C Antibody    2. Visit for screening mammogram  -     Mammo Screening Bilateral With CAD    3. Current every day smoker  -     CT Chest Low Dose Wo; Future  -     CBC & Differential  -     Comprehensive Metabolic Panel  -     Lipid Panel With LDL / HDL Ratio  -     TSH Rfx On Abnormal To Free T4  -     Vitamin D 25 Hydroxy  -     Hepatitis C Antibody    4. Anxiety and depression  -     CBC & Differential  -     Comprehensive Metabolic Panel  -     Lipid Panel With LDL / HDL Ratio  -     TSH Rfx On Abnormal To Free T4  -     Vitamin D 25 Hydroxy  -     Hepatitis C Antibody    5. History of gastric ulcer  -     pantoprazole (Protonix) 40 MG EC tablet; Take 1 tablet by mouth Daily.  Dispense: 90 tablet; Refill: 1  -     Ambulatory Referral to Gastroenterology  -     CBC & Differential  -     Comprehensive Metabolic Panel  -     Lipid Panel With LDL / HDL Ratio  -     TSH Rfx On Abnormal To Free T4  -     Vitamin D 25 Hydroxy  -     Hepatitis C Antibody    6. Health care maintenance  -     CBC & Differential  -      Comprehensive Metabolic Panel  -     Lipid Panel With LDL / HDL Ratio  -     TSH Rfx On Abnormal To Free T4  -     Vitamin D 25 Hydroxy  -     Hepatitis C Antibody    7. Tobacco use    8. Cigarette nicotine dependence without complication    Other orders  -     escitalopram (Lexapro) 10 MG tablet; Take 1 tablet by mouth Daily.  Dispense: 30 tablet; Refill: 6  -     hydrOXYzine (ATARAX) 25 MG tablet; Take 1 tablet by mouth 3 (Three) Times a Day As Needed for Itching.  Dispense: 60 tablet; Refill: 2          Patient screened positive for depression based on a PHQ-9 score of 21 on 5/13/2021. Follow-up recommendations include: PCP managing depression.         1. Chronic back pain- per pain managment  2. Anxiety and depression- start with therapist, will give lexapro and hydroxyzine.   3. Gastric ulcers- restart protonix 40 mg daily, refer to GI   4. Current every day smoker- discussed CT screening, encouraged to quit, not candidate chantix    GYN- last pap approx 2016  Screening mammo- ordered    “Discussed risks/benefits to vaccination, reviewed components of the vaccine, discussed VIS, discussed informed consent, informed consent obtained. Patient/Parent was allowed to accept or refuse vaccine. Questions answered to satisfactory state of patient/Parent. We reviewed typical age appropriate and seasonally appropriate vaccinations. Reviewed immunization history and updated state vaccination form as needed. Patient was counseled on Influenza  PPSV23  Zoster  covid

## 2021-05-13 NOTE — PATIENT INSTRUCTIONS
IF YOU SMOKE OR USE TOBACCO PLEASE READ THE FOLLOWING:  Why is smoking bad for me?  Smoking increases the risk of heart disease, lung disease, vascular disease, stroke, and cancer. If you smoke, STOP!    For more information:  Quit Now [place]  1-800-QUIT-NOW  https://[place].quitlogix.org/en-[place]/    Major Depressive Disorder, Adult  Major depressive disorder (MDD) is a mental health condition. It may also be called clinical depression or unipolar depression. MDD causes symptoms of sadness, hopelessness, and loss of interest in things. These symptoms last most of the day, almost every day, for 2 weeks. MDD can also cause physical symptoms. It can interfere with relationships and with everyday activities, such as work, school, and activities that are usually pleasant.  MDD may be mild, moderate, or severe. It may be single-episode MDD, which happens once, or recurrent MDD, which may occur multiple times.  What are the causes?  The exact cause of this condition is not known. MDD is most likely caused by a combination of things, which may include:  · Your personality traits.  · [place] or conditioned behaviors or thoughts or feelings that reinforce negativity.  · Any alcohol or substance misuse.  · Long-term (chronic) physical or mental health illness.  · Going through a traumatic experience or major life changes.  What increases the risk?  The following factors may make someone more likely to develop MDD:  · A family history of depression.  · Being a woman.  · Troubled family relationships.  · Abnormally low levels of certain brain chemicals.  · Traumatic or painful events in childhood, especially abuse or loss of a parent.  · A lot of stress from life experiences, such as poor living conditions or discrimination.  · Chronic physical illness or other mental health disorders.  What are the signs or symptoms?  The main symptoms of MDD usually include:  · Constant depressed or irritable mood.  · A loss of interest in  things and activities.  Other symptoms include:  · Sleeping or eating too much or too little.  · Unexplained weight gain or weight loss.  · Tiredness or low energy.  · Being agitated, restless, or weak.  · Feeling hopeless, worthless, or guilty.  · Trouble thinking clearly or making decisions.  · Thoughts of suicide or thoughts of harming others.  · Isolating oneself or avoiding other people or activities.  · Trouble completing tasks, work, or any normal obligations.  Severe symptoms of this condition may include:  · Psychotic depression.This may include false beliefs, or delusions. It may also include seeing, hearing, tasting, smelling, or feeling things that are not real (hallucinations).  · Chronic depression or persistent depressive disorder. This is low-level depression that lasts for at least 2 years.  · Melancholic depression, or feeling extremely sad and hopeless.  · Catatonic depression, which includes trouble speaking and trouble moving.  How is this diagnosed?  This condition may be diagnosed based on:  · Your symptoms.  · Your medical and mental health history. You may be asked questions about your lifestyle, including any drug and alcohol use.  · A physical exam.  · Blood tests to rule out other conditions.  MDD is confirmed if you have the following symptoms most of the day, nearly every day, in a 2-week period:  · Either a depressed mood or loss of interest.  · At least four other MDD symptoms.  How is this treated?  This condition is usually treated by mental health professionals, such as psychologists, psychiatrists, and clinical social workers. You may need more than one type of treatment. Treatment may include:  · Psychotherapy, also called talk therapy or counseling. Types of psychotherapy include:  ? Cognitive behavioral therapy (CBT). This teaches you to recognize unhealthy feelings, thoughts, and behaviors, and replace them with positive thoughts and actions.  ? Interpersonal therapy (IPT).  This helps you to improve the way you communicate with others or relate to them.  ? Family therapy. This treatment includes members of your family.  · Medicines to treat anxiety and depression. These medicines help to balance the brain chemicals that affect your emotions.  · Lifestyle changes. You may be asked to:  ? Limit alcohol use and avoid drug use.  ? Get regular exercise.  ? Get plenty of sleep.  ? Make healthy eating choices.  ? Spend more time outdoors.  · Brain stimulation. This may be done if symptoms are very severe and other treatments have not worked. Examples of this treatment are electroconvulsive therapy and transcranial magnetic stimulation.  Follow these instructions at home:  Activity  · Exercise regularly and spend time outdoors.  · Find activities that you enjoy doing, and make time to do them.  · Find healthy ways to manage stress, such as:  ? Meditation or deep breathing.  ? Spending time in nature.  ? Journaling.  · Return to your normal activities as told by your health care provider. Ask your health care provider what activities are safe for you.  Alcohol and drug use  · If you drink alcohol:  ? Limit how much you use to:  § 0-1 drink a day for women who are not pregnant.  § 0-2 drinks a day for men.  ? Be aware of how much alcohol is in your drink. In the [place] one drink equals one 12 oz bottle of beer (355 mL), one 5 oz glass of wine (148 mL), or one 1½ oz glass of hard liquor (44 mL).  ? Discuss your alcohol use with your health care provider. Alcohol can affect any antidepressant medicines you are taking.  · Discuss any drug use with your health care provider.  General instructions    · Take over-the-counter and prescription medicines only as told by your health care provider.  · Eat a healthy diet and get plenty of sleep.  · Consider joining a support group. Your health care provider may be able to recommend one.  · Keep all follow-up visits as told by your health care provider. This  is important.  Where to find more information  · National [place] on Mental Illness: www.matthew.org  · [place] National [place] of Mental Health: www.nimh.nih.gov  Contact a health care provider if:  · Your symptoms get worse.  · You develop new symptoms.  Get help right away if:  · You self-harm.  · You have serious thoughts about hurting yourself or others.  · You hallucinate.  If you ever feel like you may hurt yourself or others, or have thoughts about taking your own life, get help right away. Go to your nearest emergency department or:  · Call your local emergency services (210 in the [place]  · Call a suicide crisis helpline, such as the National Suicide Prevention Lifeline at 1-261.926.1796. This is open 24 hours a day in the [place]  · Text the Crisis Text Line at 053565 (in the [place]  Summary  · Major depressive disorder (MDD) is a mental health condition. MDD causes symptoms of sadness, hopelessness, and loss of interest in things. These symptoms last most of the day, almost every day, for 2 weeks.  · The symptoms of MDD can interfere with relationships and with everyday activities.  · Treatments and support are available for people who develop MDD. You may need more than one type of treatment.  · Get help right away if you have serious thoughts about hurting yourself or others.  This information is not intended to replace advice given to you by your health care provider. Make sure you discuss any questions you have with your health care provider.  Document Revised: 11/28/2020 Document Reviewed: 11/28/2020  Elsevier Patient Education © 2021 [employer] Inc.

## 2021-05-14 LAB
25(OH)D3+25(OH)D2 SERPL-MCNC: 17.6 NG/ML (ref 30–100)
ALBUMIN SERPL-MCNC: 4.3 G/DL (ref 3.5–5.2)
ALBUMIN/GLOB SERPL: 2.4 G/DL
ALP SERPL-CCNC: 79 U/L (ref 39–117)
ALT SERPL-CCNC: 14 U/L (ref 1–33)
AST SERPL-CCNC: 14 U/L (ref 1–32)
BASOPHILS # BLD AUTO: 0.08 10*3/MM3 (ref 0–0.2)
BASOPHILS NFR BLD AUTO: 1.3 % (ref 0–1.5)
BILIRUB SERPL-MCNC: 0.5 MG/DL (ref 0–1.2)
BUN SERPL-MCNC: 10 MG/DL (ref 8–23)
BUN/CREAT SERPL: 16.4 (ref 7–25)
CALCIUM SERPL-MCNC: 9.7 MG/DL (ref 8.6–10.5)
CHLORIDE SERPL-SCNC: 105 MMOL/L (ref 98–107)
CHOLEST SERPL-MCNC: 249 MG/DL (ref 0–200)
CO2 SERPL-SCNC: 29 MMOL/L (ref 22–29)
CREAT SERPL-MCNC: 0.61 MG/DL (ref 0.57–1)
EOSINOPHIL # BLD AUTO: 0.05 10*3/MM3 (ref 0–0.4)
EOSINOPHIL NFR BLD AUTO: 0.8 % (ref 0.3–6.2)
ERYTHROCYTE [DISTWIDTH] IN BLOOD BY AUTOMATED COUNT: 13.1 % (ref 12.3–15.4)
GLOBULIN SER CALC-MCNC: 1.8 GM/DL
GLUCOSE SERPL-MCNC: 90 MG/DL (ref 65–99)
HCT VFR BLD AUTO: 45 % (ref 34–46.6)
HCV AB S/CO SERPL IA: <0.1 S/CO RATIO (ref 0–0.9)
HDLC SERPL-MCNC: 70 MG/DL (ref 40–60)
HGB BLD-MCNC: 15.3 G/DL (ref 12–15.9)
IMM GRANULOCYTES # BLD AUTO: 0.02 10*3/MM3 (ref 0–0.05)
IMM GRANULOCYTES NFR BLD AUTO: 0.3 % (ref 0–0.5)
LDLC SERPL CALC-MCNC: 154 MG/DL (ref 0–100)
LDLC/HDLC SERPL: 2.15 {RATIO}
LYMPHOCYTES # BLD AUTO: 1.4 10*3/MM3 (ref 0.7–3.1)
LYMPHOCYTES NFR BLD AUTO: 22.2 % (ref 19.6–45.3)
MCH RBC QN AUTO: 29.4 PG (ref 26.6–33)
MCHC RBC AUTO-ENTMCNC: 34 G/DL (ref 31.5–35.7)
MCV RBC AUTO: 86.4 FL (ref 79–97)
MONOCYTES # BLD AUTO: 0.6 10*3/MM3 (ref 0.1–0.9)
MONOCYTES NFR BLD AUTO: 9.5 % (ref 5–12)
NEUTROPHILS # BLD AUTO: 4.16 10*3/MM3 (ref 1.7–7)
NEUTROPHILS NFR BLD AUTO: 65.9 % (ref 42.7–76)
NRBC BLD AUTO-RTO: 0 /100 WBC (ref 0–0.2)
PLATELET # BLD AUTO: 288 10*3/MM3 (ref 140–450)
POTASSIUM SERPL-SCNC: 5 MMOL/L (ref 3.5–5.2)
PROT SERPL-MCNC: 6.1 G/DL (ref 6–8.5)
RBC # BLD AUTO: 5.21 10*6/MM3 (ref 3.77–5.28)
SODIUM SERPL-SCNC: 140 MMOL/L (ref 136–145)
TRIGL SERPL-MCNC: 141 MG/DL (ref 0–150)
TSH SERPL DL<=0.005 MIU/L-ACNC: 2.14 UIU/ML (ref 0.27–4.2)
VLDLC SERPL CALC-MCNC: 25 MG/DL (ref 5–40)
WBC # BLD AUTO: 6.31 10*3/MM3 (ref 3.4–10.8)

## 2021-12-22 ENCOUNTER — TELEPHONE (OUTPATIENT)
Dept: NEUROSURGERY | Facility: CLINIC | Age: 61
End: 2021-12-22

## 2023-02-28 ENCOUNTER — OFFICE VISIT (OUTPATIENT)
Dept: FAMILY MEDICINE CLINIC | Facility: CLINIC | Age: 63
End: 2023-02-28
Payer: COMMERCIAL

## 2023-02-28 VITALS
DIASTOLIC BLOOD PRESSURE: 98 MMHG | TEMPERATURE: 99.8 F | BODY MASS INDEX: 35.29 KG/M2 | WEIGHT: 219.6 LBS | SYSTOLIC BLOOD PRESSURE: 174 MMHG | OXYGEN SATURATION: 95 % | HEIGHT: 66 IN | HEART RATE: 93 BPM

## 2023-02-28 DIAGNOSIS — M50.123 CERVICAL DISC DISORDER AT C6-C7 LEVEL WITH RADICULOPATHY: ICD-10-CM

## 2023-02-28 DIAGNOSIS — I10 ESSENTIAL HYPERTENSION: ICD-10-CM

## 2023-02-28 DIAGNOSIS — F41.9 ANXIETY AND DEPRESSION: ICD-10-CM

## 2023-02-28 DIAGNOSIS — M54.50 LOW BACK PAIN, UNSPECIFIED BACK PAIN LATERALITY, UNSPECIFIED CHRONICITY, UNSPECIFIED WHETHER SCIATICA PRESENT: ICD-10-CM

## 2023-02-28 DIAGNOSIS — Z12.31 ENCOUNTER FOR SCREENING MAMMOGRAM FOR MALIGNANT NEOPLASM OF BREAST: ICD-10-CM

## 2023-02-28 DIAGNOSIS — R07.89 CHEST PRESSURE: Primary | ICD-10-CM

## 2023-02-28 DIAGNOSIS — F32.A ANXIETY AND DEPRESSION: ICD-10-CM

## 2023-02-28 DIAGNOSIS — F51.01 PRIMARY INSOMNIA: ICD-10-CM

## 2023-02-28 DIAGNOSIS — R00.2 PALPITATIONS: ICD-10-CM

## 2023-02-28 PROCEDURE — 93000 ELECTROCARDIOGRAM COMPLETE: CPT | Performed by: NURSE PRACTITIONER

## 2023-02-28 PROCEDURE — 99214 OFFICE O/P EST MOD 30 MIN: CPT | Performed by: NURSE PRACTITIONER

## 2023-02-28 RX ORDER — AMLODIPINE BESYLATE 5 MG/1
5 TABLET ORAL DAILY
Qty: 30 TABLET | Refills: 0 | Status: SHIPPED | OUTPATIENT
Start: 2023-02-28 | End: 2023-03-27

## 2023-02-28 RX ORDER — PRAZOSIN HYDROCHLORIDE 5 MG/1
5 CAPSULE ORAL NIGHTLY
COMMUNITY

## 2023-02-28 RX ORDER — IBUPROFEN 800 MG/1
TABLET ORAL
COMMUNITY
Start: 2022-11-26

## 2023-02-28 RX ORDER — BUSPIRONE HYDROCHLORIDE 10 MG/1
TABLET ORAL
COMMUNITY
Start: 2023-02-08

## 2023-02-28 RX ORDER — DIAZEPAM 10 MG/1
TABLET ORAL
COMMUNITY
Start: 2023-01-11

## 2023-02-28 RX ORDER — ESCITALOPRAM OXALATE 20 MG/1
20 TABLET ORAL DAILY
COMMUNITY

## 2023-03-01 PROBLEM — Z12.31 ENCOUNTER FOR SCREENING MAMMOGRAM FOR MALIGNANT NEOPLASM OF BREAST: Status: ACTIVE | Noted: 2023-03-01

## 2023-03-03 NOTE — ASSESSMENT & PLAN NOTE
No EKG changes of acute ischemia, no clinical evidence of acute MI, responsibility rests with patient to return for follow up for any CP/SOA, go to ER for any further signs or symptoms

## 2023-03-14 ENCOUNTER — HOSPITAL ENCOUNTER (OUTPATIENT)
Dept: CARDIOLOGY | Facility: HOSPITAL | Age: 63
Discharge: HOME | End: 2023-03-14
Admitting: NURSE PRACTITIONER
Payer: COMMERCIAL

## 2023-03-14 DIAGNOSIS — R07.89 CHEST PRESSURE: ICD-10-CM

## 2023-03-14 DIAGNOSIS — R00.2 PALPITATIONS: ICD-10-CM

## 2023-03-14 PROCEDURE — 93242 EXT ECG>48HR<7D RECORDING: CPT

## 2023-03-27 DIAGNOSIS — I10 ESSENTIAL HYPERTENSION: ICD-10-CM

## 2023-03-27 RX ORDER — AMLODIPINE BESYLATE 5 MG/1
TABLET ORAL
Qty: 30 TABLET | Refills: 0 | Status: SHIPPED | OUTPATIENT
Start: 2023-03-27

## 2023-04-05 LAB
MAXIMAL PREDICTED HEART RATE: 158 BPM
STRESS TARGET HR: 134 BPM

## 2023-05-11 ENCOUNTER — OFFICE VISIT (OUTPATIENT)
Dept: FAMILY MEDICINE CLINIC | Facility: CLINIC | Age: 63
End: 2023-05-11
Payer: COMMERCIAL

## 2023-05-11 VITALS
BODY MASS INDEX: 35.2 KG/M2 | WEIGHT: 219 LBS | DIASTOLIC BLOOD PRESSURE: 84 MMHG | RESPIRATION RATE: 18 BRPM | SYSTOLIC BLOOD PRESSURE: 140 MMHG | HEART RATE: 66 BPM | TEMPERATURE: 97.4 F | OXYGEN SATURATION: 96 % | HEIGHT: 66 IN

## 2023-05-11 DIAGNOSIS — K52.9 SIGMOIDITIS: Primary | ICD-10-CM

## 2023-05-11 DIAGNOSIS — I10 ESSENTIAL HYPERTENSION: ICD-10-CM

## 2023-05-11 RX ORDER — METRONIDAZOLE 500 MG/1
TABLET ORAL
COMMUNITY
Start: 2023-05-04

## 2023-05-11 RX ORDER — AMLODIPINE BESYLATE 5 MG/1
5 TABLET ORAL DAILY
Qty: 30 TABLET | Refills: 1 | Status: SHIPPED | OUTPATIENT
Start: 2023-05-11

## 2023-05-11 RX ORDER — PANTOPRAZOLE SODIUM 20 MG/1
20 TABLET, DELAYED RELEASE ORAL DAILY
Qty: 30 TABLET | Refills: 0 | Status: SHIPPED | OUTPATIENT
Start: 2023-05-11

## 2023-05-11 RX ORDER — AMOXICILLIN AND CLAVULANATE POTASSIUM 875; 125 MG/1; MG/1
TABLET, FILM COATED ORAL
COMMUNITY
Start: 2023-05-04

## 2023-05-11 NOTE — PROGRESS NOTES
"    Chief Complaint  discuss c/t danaes Winter (Told mass at ER/last egd scope at [facility] 9 years ago dx with ulcers) and Hypertension (Needs refill on Amlodipine )    Subjective    History of Present Illness {CC  Problem List  Visit  Diagnosis   Encounters  Notes  Medications  Labs  Result Review Imaging  Media :23}     Anjana Smith presents to [facility] for   History of Present Illness     64 yo female patient of Soha BRAVO present for ER followup states she was having LLQ pain. States she is feeling better.  She is taking amoxicillin and flagyl currently. She states during hosptilaziation imaging taken, she is suppose to follow with GI for abnormalities seen.  Follow up with GI specialist at [facility] to evaluate abnormal finding on imaging.     CT abdomen at [facility].   IMPRESSION:   1. Thickening of the sigmoid colon with mild surrounding inflammation compatible with sigmoiditis.     2. Remaining chronic and incidental findings noted above.         Social History     Socioeconomic History   • Marital status: Single   • Number of children: 6   Tobacco Use   • Smoking status: Every Day     Packs/day: 1.00     Years: 15.00     Pack years: 15.00     Types: Cigarettes     Start date: 5/13/1978   • Smokeless tobacco: Never   Vaping Use   • Vaping Use: Never used   Substance and Sexual Activity   • Alcohol use: No   • Drug use: No   • Sexual activity: Not Currently     Partners: Male     Birth control/protection: I.U.D.      Objective     Vital Signs:   /84 (BP Location: Left arm, Patient Position: Sitting, Cuff Size: Large Adult)   Pulse 66   Temp 97.4 °F (36.3 °C) (Infrared)   Resp 18   Ht 167.6 cm (65.98\")   Wt 99.3 kg (219 lb)   SpO2 96%   BMI 35.37 kg/m²   Physical Exam  Constitutional:       General: She is not in acute distress.     Appearance: She is not ill-appearing.   HENT:      Head: Normocephalic.   Eyes:      Conjunctiva/sclera: " Conjunctivae normal.   Cardiovascular:      Rate and Rhythm: Regular rhythm.      Heart sounds: Normal heart sounds.   Pulmonary:      Effort: No respiratory distress.      Breath sounds: Normal breath sounds. No wheezing.   Abdominal:      General: There is no distension.      Palpations: Abdomen is soft.      Tenderness: There is no abdominal tenderness. There is no guarding.   Neurological:      General: No focal deficit present.      Mental Status: She is alert.        Result Review  Data Reviewed:{ Labs  Result Review  Imaging  Med Tab  Media :23}   The following data was reviewed by: Racquel Lee MD on 05/11/2023  No results for input(s): GLU, BUN, CREATININE, EGFRIFNONA, EGFRIFAFRI, NA, K, CL, CALCIUM, ALBUMIN, BILITOT, ALKPHOS, AST, ALT, CHLPL, TRIG, HDL, VLDL, LDL, LDLHDL, CKTOTAL, HGBA1C, MICROALBUR, WBC, RBC, HB, HCT, MCV, MCH, TSH, FREET4, PSA, INR, [id number], URICACID in the last 94140 hours.    Invalid input(s): PROTEINTOTREF, PLATELETS           Current Outpatient Medications:   •  amLODIPine (NORVASC) 5 MG tablet, Take 1 tablet by mouth Daily., Disp: 30 tablet, Rfl: 1  •  amoxicillin-clavulanate (AUGMENTIN) 875-125 MG per tablet, , Disp: , Rfl:   •  busPIRone (BUSPAR) 10 MG tablet, Take 1 tablet by mouth 3 (Three) Times a Day., Disp: , Rfl:   •  diazePAM (VALIUM) 10 MG tablet, 1 tablet At Night As Needed., Disp: , Rfl:   •  escitalopram (LEXAPRO) 20 MG tablet, Take 1 tablet by mouth Daily., Disp: , Rfl:   •  ibuprofen (ADVIL,MOTRIN) 800 MG tablet, , Disp: , Rfl:   •  metroNIDAZOLE (FLAGYL) 500 MG tablet, , Disp: , Rfl:   •  pantoprazole (Protonix) 20 MG EC tablet, Take 1 tablet by mouth Daily., Disp: 30 tablet, Rfl: 0      Assessment and Plan {CC Problem List  Visit Diagnosis  ROS  Review (Popup)  Health Maintenance  Quality  BestPractice  Medications  SmartSets  SnapShot Encounters  Media :23}   Problem List Items Addressed This Visit        Cardiac and Vasculature    Essential  hypertension    Relevant Medications    amLODIPine (NORVASC) 5 MG tablet   Other Visit Diagnoses     Sigmoiditis    -  Primary        Continue antibiotics given at discharge,   Follow with GI specialist as prescribed.         Follow Up   Return in about 3 months (around 8/11/2023) for with her pcp Soha BRAVO recheck .  Patient was given instructions and counseling regarding her condition or for health maintenance advice. Please see specific information pulled into the AVS if appropriate.    EpicAct:MR_WS_AMB_ORDERS,RunParams:STARTUPTYPE=FOLLOW    MR_WS_AMB_DISCHARGE

## 2023-06-07 RX ORDER — PANTOPRAZOLE SODIUM 20 MG/1
TABLET, DELAYED RELEASE ORAL
Qty: 30 TABLET | Refills: 0 | Status: SHIPPED | OUTPATIENT
Start: 2023-06-07

## 2023-07-31 ENCOUNTER — TELEPHONE (OUTPATIENT)
Dept: FAMILY MEDICINE CLINIC | Facility: CLINIC | Age: 63
End: 2023-07-31
Payer: COMMERCIAL

## 2023-07-31 DIAGNOSIS — K21.9 GASTROESOPHAGEAL REFLUX DISEASE WITHOUT ESOPHAGITIS: Primary | ICD-10-CM

## 2023-08-14 DIAGNOSIS — I10 ESSENTIAL HYPERTENSION: ICD-10-CM

## 2023-08-14 RX ORDER — AMLODIPINE BESYLATE 5 MG/1
TABLET ORAL
Qty: 30 TABLET | Refills: 1 | Status: SHIPPED | OUTPATIENT
Start: 2023-08-14

## 2023-10-17 DIAGNOSIS — I10 ESSENTIAL HYPERTENSION: ICD-10-CM

## 2023-10-17 RX ORDER — AMLODIPINE BESYLATE 5 MG/1
5 TABLET ORAL DAILY
Qty: 30 TABLET | Refills: 0 | Status: SHIPPED | OUTPATIENT
Start: 2023-10-17

## 2023-11-20 DIAGNOSIS — I10 ESSENTIAL HYPERTENSION: ICD-10-CM

## 2023-11-20 RX ORDER — AMLODIPINE BESYLATE 5 MG/1
5 TABLET ORAL DAILY
Qty: 30 TABLET | Refills: 0 | Status: SHIPPED | OUTPATIENT
Start: 2023-11-20

## 2023-12-28 ENCOUNTER — OFFICE VISIT (OUTPATIENT)
Dept: FAMILY MEDICINE CLINIC | Facility: CLINIC | Age: 63
End: 2023-12-28
Payer: COMMERCIAL

## 2023-12-28 VITALS
TEMPERATURE: 98 F | HEIGHT: 66 IN | OXYGEN SATURATION: 96 % | HEART RATE: 85 BPM | BODY MASS INDEX: 33.52 KG/M2 | WEIGHT: 208.6 LBS | DIASTOLIC BLOOD PRESSURE: 76 MMHG | SYSTOLIC BLOOD PRESSURE: 138 MMHG

## 2023-12-28 DIAGNOSIS — E87.6 HYPOKALEMIA: ICD-10-CM

## 2023-12-28 DIAGNOSIS — R39.15 URINARY URGENCY: ICD-10-CM

## 2023-12-28 DIAGNOSIS — R35.0 URINARY FREQUENCY: ICD-10-CM

## 2023-12-28 DIAGNOSIS — K21.9 GASTROESOPHAGEAL REFLUX DISEASE, UNSPECIFIED WHETHER ESOPHAGITIS PRESENT: ICD-10-CM

## 2023-12-28 DIAGNOSIS — N30.01 ACUTE CYSTITIS WITH HEMATURIA: Primary | ICD-10-CM

## 2023-12-28 DIAGNOSIS — I10 ESSENTIAL HYPERTENSION: ICD-10-CM

## 2023-12-28 DIAGNOSIS — R30.0 DYSURIA: ICD-10-CM

## 2023-12-28 RX ORDER — PANTOPRAZOLE SODIUM 20 MG/1
20 TABLET, DELAYED RELEASE ORAL DAILY
Qty: 30 TABLET | Refills: 2 | Status: SHIPPED | OUTPATIENT
Start: 2023-12-28

## 2023-12-28 RX ORDER — NITROFURANTOIN 25; 75 MG/1; MG/1
100 CAPSULE ORAL 2 TIMES DAILY
Qty: 14 CAPSULE | Refills: 0 | Status: SHIPPED | OUTPATIENT
Start: 2023-12-28

## 2023-12-28 NOTE — PROGRESS NOTES
"Chief Complaint  Urinary Tract Infection (Pt is here for uti symtpoms burning,pain since 4 days and potassium rechecked.)    Subjective        Anjana Smith presents to [facility]    History of Present Illness  63 year old female presents for symptoms of urinary frequency, urinary urgency, and dysuria that began 4 days ago. POC urinalysis shows blood, protein, and leukocytes in urine. Macrobid sent to pharmacy. Urine culture ordered. Pt would like her potassium levels checked today. Potassium level of 3.4 on 8/13/23 at . States she was given potassium replacement in hospital. Noticed numbness in hands. CMP ordered. Pt has hypertension that is stable in clinic today at 138/76. Continue current treatment regimen. Pt needed her pantoprazole refilled, sent to pharmacy.   Dysuria   The current episode started in the past 7 days. The problem occurs constantly. The problem has been unchanged. The quality of the pain is described as burning. The pain is at a severity of 6/10. There has been no fever. She is not sexually active. There is a history of pyelonephritis. Associated symptoms include frequency and urgency. Pertinent negatives include no chills, discharge, flank pain, hematuria, hesitancy, nausea, possible pregnancy, sweats or vomiting.   Additional comments: Need potassium checked  Urinary Tract Infection   Associated symptoms include frequency and urgency. Pertinent negatives include no chills, discharge, flank pain, hematuria, hesitancy, nausea, possible pregnancy, sweats or vomiting.       Objective   Vital Signs:  /76   Pulse 85   Temp 98 °F (36.7 °C)   Ht 167.6 cm (65.98\")   Wt 94.6 kg (208 lb 9.6 oz)   SpO2 96%   BMI 33.69 kg/m²   Estimated body mass index is 33.69 kg/m² as calculated from the following:    Height as of this encounter: 167.6 cm (65.98\").    Weight as of this encounter: 94.6 kg (208 lb 9.6 oz).       BMI is >= 30 and <35. (Class 1 Obesity). The " following options were offered after discussion;: weight loss educational material (shared in after visit summary), exercise counseling/recommendations, and nutrition counseling/recommendations      Physical Exam  Constitutional:       Appearance: Normal appearance.   HENT:      Head: Normocephalic.   Eyes:      Conjunctiva/sclera: Conjunctivae normal.      Pupils: Pupils are equal, round, and reactive to light.   Cardiovascular:      Rate and Rhythm: Normal rate and regular rhythm.      Pulses: Normal pulses.      Heart sounds: Normal heart sounds.   Pulmonary:      Effort: Pulmonary effort is normal.      Breath sounds: Normal breath sounds.   Skin:     General: Skin is warm.   Neurological:      General: No focal deficit present.      Mental Status: She is alert and oriented to person, place, and time.   Psychiatric:         Mood and Affect: Mood normal.         Behavior: Behavior normal.            Result Review :  The following data was reviewed by: SHELLY Borja on 12/28/2023:  Common labs          12/30/2023    03:05 12/30/2023    11:35 12/31/2023    03:18 1/1/2024    03:11   Common Labs   WBC 8.42     8.58     7.71     9.22       Hemoglobin 6.7     11.1     11.3     12.5       Hematocrit 21.6     33.7     35.2     38.2       Platelets 150     191     178     211          Details          This result is from an external source.             Data reviewed : labs              Assessment and Plan   Diagnoses and all orders for this visit:    1. Acute cystitis with hematuria (Primary)  -     nitrofurantoin, macrocrystal-monohydrate, (Macrobid) 100 MG capsule; Take 1 capsule by mouth 2 (Two) Times a Day.  Dispense: 14 capsule; Refill: 0  -     Urine Culture - Urine, Urine, Clean Catch    2. Dysuria  -     POCT urinalysis dipstick, automated    3. Urinary frequency  -     POCT urinalysis dipstick, automated    4. Urinary urgency  -     POCT urinalysis dipstick, automated    5. Hypokalemia  -     Comprehensive  metabolic panel    6. Essential hypertension  Assessment & Plan:  Hypertension is  stable .  Continue current treatment regimen.  Blood pressure will be reassessed at the next regular appointment.      7. Gastroesophageal reflux disease, unspecified whether esophagitis present  -     pantoprazole (PROTONIX) 20 MG EC tablet; Take 1 tablet by mouth Daily.  Dispense: 30 tablet; Refill: 2             Follow Up   No follow-ups on file.  Patient was given instructions and counseling regarding her condition or for health maintenance advice. Please see specific information pulled into the AVS if appropriate.

## 2023-12-29 DIAGNOSIS — I10 ESSENTIAL HYPERTENSION: ICD-10-CM

## 2023-12-30 LAB
ALBUMIN SERPL-MCNC: 4.6 G/DL (ref 3.9–4.9)
ALBUMIN/GLOB SERPL: 1.8 {RATIO} (ref 1.2–2.2)
ALP SERPL-CCNC: 92 IU/L (ref 44–121)
ALT SERPL-CCNC: 22 IU/L (ref 0–32)
AST SERPL-CCNC: 16 IU/L (ref 0–40)
BACTERIA UR CULT: NORMAL
BACTERIA UR CULT: NORMAL
BILIRUB SERPL-MCNC: 0.4 MG/DL (ref 0–1.2)
BUN SERPL-MCNC: 11 MG/DL (ref 8–27)
BUN/CREAT SERPL: 13 (ref 12–28)
CALCIUM SERPL-MCNC: 9.6 MG/DL (ref 8.7–10.3)
CHLORIDE SERPL-SCNC: 109 MMOL/L (ref 96–106)
CO2 SERPL-SCNC: 18 MMOL/L (ref 20–29)
CREAT SERPL-MCNC: 0.83 MG/DL (ref 0.57–1)
EGFRCR SERPLBLD CKD-EPI 2021: 80 ML/MIN/1.73
GLOBULIN SER CALC-MCNC: 2.5 G/DL (ref 1.5–4.5)
GLUCOSE SERPL-MCNC: 98 MG/DL (ref 70–99)
POTASSIUM SERPL-SCNC: 3.9 MMOL/L (ref 3.5–5.2)
PROT SERPL-MCNC: 7.1 G/DL (ref 6–8.5)
SODIUM SERPL-SCNC: 142 MMOL/L (ref 134–144)

## 2023-12-31 ENCOUNTER — INPATIENT HOSPITAL (OUTPATIENT)
Dept: URBAN - METROPOLITAN AREA HOSPITAL 107 | Facility: HOSPITAL | Age: 63
End: 2023-12-31
Payer: COMMERCIAL

## 2023-12-31 DIAGNOSIS — D64.9 ANEMIA, UNSPECIFIED: ICD-10-CM

## 2023-12-31 DIAGNOSIS — R19.7 DIARRHEA, UNSPECIFIED: ICD-10-CM

## 2023-12-31 PROCEDURE — 99222 1ST HOSP IP/OBS MODERATE 55: CPT | Performed by: INTERNAL MEDICINE

## 2024-01-01 ENCOUNTER — INPATIENT HOSPITAL (OUTPATIENT)
Dept: URBAN - METROPOLITAN AREA HOSPITAL 107 | Facility: HOSPITAL | Age: 64
End: 2024-01-01
Payer: COMMERCIAL

## 2024-01-01 DIAGNOSIS — K29.70 GASTRITIS, UNSPECIFIED, WITHOUT BLEEDING: ICD-10-CM

## 2024-01-01 DIAGNOSIS — R19.7 DIARRHEA, UNSPECIFIED: ICD-10-CM

## 2024-01-01 DIAGNOSIS — K57.30 DIVERTICULOSIS OF LARGE INTESTINE WITHOUT PERFORATION OR ABS: ICD-10-CM

## 2024-01-01 DIAGNOSIS — D64.9 ANEMIA, UNSPECIFIED: ICD-10-CM

## 2024-01-01 DIAGNOSIS — K64.8 OTHER HEMORRHOIDS: ICD-10-CM

## 2024-01-01 PROCEDURE — 99233 SBSQ HOSP IP/OBS HIGH 50: CPT | Performed by: INTERNAL MEDICINE

## 2024-01-02 LAB
BILIRUB BLD-MCNC: NEGATIVE MG/DL
CLARITY, POC: CLEAR
COLOR UR: YELLOW
EXPIRATION DATE: ABNORMAL
GLUCOSE UR STRIP-MCNC: NEGATIVE MG/DL
KETONES UR QL: NEGATIVE
LEUKOCYTE EST, POC: ABNORMAL
Lab: ABNORMAL
NITRITE UR-MCNC: NEGATIVE MG/ML
PH UR: 6 [PH] (ref 5–8)
PROT UR STRIP-MCNC: ABNORMAL MG/DL
RBC # UR STRIP: ABNORMAL /UL
SP GR UR: 1.02 (ref 1–1.03)
UROBILINOGEN UR QL: ABNORMAL

## 2024-01-03 RX ORDER — AMLODIPINE BESYLATE 5 MG/1
5 TABLET ORAL DAILY
Qty: 30 TABLET | Refills: 0 | Status: SHIPPED | OUTPATIENT
Start: 2024-01-03

## 2024-01-23 ENCOUNTER — OFFICE VISIT (OUTPATIENT)
Dept: FAMILY MEDICINE CLINIC | Facility: CLINIC | Age: 64
End: 2024-01-23
Payer: COMMERCIAL

## 2024-01-23 VITALS
SYSTOLIC BLOOD PRESSURE: 110 MMHG | TEMPERATURE: 97.7 F | OXYGEN SATURATION: 98 % | BODY MASS INDEX: 33.37 KG/M2 | HEART RATE: 76 BPM | WEIGHT: 207.6 LBS | DIASTOLIC BLOOD PRESSURE: 70 MMHG | HEIGHT: 66 IN

## 2024-01-23 DIAGNOSIS — I10 ESSENTIAL HYPERTENSION: Chronic | ICD-10-CM

## 2024-01-23 DIAGNOSIS — Z00.00 ANNUAL PHYSICAL EXAM: Primary | ICD-10-CM

## 2024-01-23 DIAGNOSIS — K86.89 PANCREATIC INSUFFICIENCY: ICD-10-CM

## 2024-01-23 DIAGNOSIS — Z09 HOSPITAL DISCHARGE FOLLOW-UP: ICD-10-CM

## 2024-01-23 NOTE — PROGRESS NOTES
"Chief Complaint  Referral - Pancreas Txp (Pt was told needs to see specialist for Pancreas)    Subjective        Anjana Smith presents to [facility]  History of Present Illness  64-year-old white female status post recent hospital discharge was called today about an abnormal pancreatic elastase and instructed to follow-up with PCP for further evaluation and discussion.  Patient also here to complete her annual physical exam.    Pt states she was admitted in hospital 1 mo ago and got a call a week ago and was informed she has, \"pancreatic EPI' and was instructed to see a specialist.  Today pt denied any N/V/D.    Denied any Abdominal pain.  Pt s/p pain mgmt visit yesterday and was informed to leave the pump at its current settings and will RTO 1/31/24.    Pt denied  current TOB/ETOH/IVDU use.      Objective   Vital Signs:  /70 (BP Location: Left arm, Patient Position: Sitting, Cuff Size: Adult)   Pulse 76   Temp 97.7 °F (36.5 °C) (Temporal)   Ht 167.6 cm (65.98\")   Wt 94.2 kg (207 lb 9.6 oz)   SpO2 98%   BMI 33.52 kg/m²   Estimated body mass index is 33.52 kg/m² as calculated from the following:    Height as of this encounter: 167.6 cm (65.98\").    Weight as of this encounter: 94.2 kg (207 lb 9.6 oz).               Physical Exam  Constitutional:       Appearance: Normal appearance.   HENT:      Head: Normocephalic and atraumatic.   Eyes:      Conjunctiva/sclera: Conjunctivae normal.   Cardiovascular:      Rate and Rhythm: Normal rate and regular rhythm.      Heart sounds: Normal heart sounds.   Pulmonary:      Effort: Pulmonary effort is normal.      Breath sounds: Normal breath sounds.   Abdominal:      General: Bowel sounds are normal. There is no distension.      Palpations: Abdomen is soft. There is no mass.      Tenderness: There is no abdominal tenderness. There is no guarding or rebound.      Comments: Abdomen soft, non-tender, positive bowel sounds.  No guarding " or rebound tenderness.  No masses noted.   Skin:     General: Skin is warm.   Neurological:      General: No focal deficit present.      Mental Status: She is alert and oriented to person, place, and time.   Psychiatric:         Mood and Affect: Mood normal.         Behavior: Behavior normal.        Result Review :    The following data was reviewed by: SHELLY Willson on 01/23/2024:  CBC w/diff          12/30/2023    03:05 12/30/2023    11:35 12/31/2023    03:18 1/1/2024    03:11   CBC w/Diff   WBC 8.42     8.58     7.71     9.22       RBC 2.31     3.80     3.96     4.30       Hemoglobin 6.7     11.1     11.3     12.5       Hematocrit 21.6     33.7     35.2     38.2       MCV 93.5     88.7     88.9     88.8       MCH 29.0     29.2     28.5     29.1       MCHC 31.0     32.9     32.1     32.7       RDW 14.1     13.8     14.2     13.9       Platelets 150     191     178     211       Neutrophil Rel % 70.3     62.2     48.9     53.9       Immature Granulocyte Rel % 0.4     0.2     0.3     0.4       Lymphocyte Rel % 23.3     30.7     41.0     36.3       Monocyte Rel % 5.2     5.0     6.7     6.6       Eosinophil Rel % 0.6     1.3     2.2     2.1       Basophil Rel % 0.2     0.6     0.9     0.7          Details          This result is from an external source.                     Data reviewed : Recent hospitalization notes reviewed recent hospital discharge summary from [facility] from December 29, 2023.             Assessment and Plan     Diagnoses and all orders for this visit:    1. Annual physical exam (Primary)  Assessment & Plan:  Counseling was provided on nutrition, physical activity, development, and injury prevention, dental health, and safe sex practices patient verbalizes understanding no additional questions were asked.             2. Pancreatic insufficiency  Comments:  moderately low Pancreatic elastase 12/31/23 at [facility]  Assessment & Plan:  Patient referred to GI specialty and  pancreas, patient voiced understanding.    Orders:  -     Ambulatory Referral to Gastroenterology    3. Hospital discharge follow-up  Assessment & Plan:  Improved    Orders:  -     CBC & Differential; Future  -     Comprehensive Metabolic Panel; Future  -     Lipid Panel; Future    4. Essential hypertension  Assessment & Plan:  Hypertension is improving with treatment.  Continue current treatment regimen.  Dietary sodium restriction.  Weight loss.  Ambulatory blood pressure monitoring.  Blood pressure will be reassessed in 3 months.Discussed the importance of healthy diet, nutrition, and lifestyle. Recommend low salt, fat/cholesterol diet and avoid concentrated sweets. Encouraged DASH diet along with fresh fruits & vegetables and low fat dairy products. Counseled patient to exercise/walk as tolerated. Avoid tobacco and alcohol use.          Instructed patient to Return to Office as needed for persistent or new symptoms and/or go to ER for worsening symptoms, patient voiced understanding.          Follow Up     Return in about 4 weeks (around 2/20/2024).  Patient was given instructions and counseling regarding her condition or for health maintenance advice. Please see specific information pulled into the AVS if appropriate.

## 2024-01-24 NOTE — ASSESSMENT & PLAN NOTE
Hypertension is improving with treatment.  Continue current treatment regimen.  Dietary sodium restriction.  Weight loss.  Ambulatory blood pressure monitoring.  Blood pressure will be reassessed in 3 months.Discussed the importance of healthy diet, nutrition, and lifestyle. Recommend low salt, fat/cholesterol diet and avoid concentrated sweets. Encouraged DASH diet along with fresh fruits & vegetables and low fat dairy products. Counseled patient to exercise/walk as tolerated. Avoid tobacco and alcohol use.

## 2024-01-24 NOTE — ASSESSMENT & PLAN NOTE
Counseling was provided on nutrition, physical activity, development, and injury prevention, dental health, and safe sex practices patient verbalizes understanding no additional questions were asked.

## 2024-02-13 ENCOUNTER — TELEPHONE (OUTPATIENT)
Dept: FAMILY MEDICINE CLINIC | Facility: CLINIC | Age: 64
End: 2024-02-13
Payer: COMMERCIAL

## 2024-02-13 RX ORDER — ATORVASTATIN CALCIUM 10 MG/1
10 TABLET, FILM COATED ORAL DAILY
Qty: 90 TABLET | Refills: 0 | Status: SHIPPED | OUTPATIENT
Start: 2024-02-13

## 2024-03-27 DIAGNOSIS — K21.9 GASTROESOPHAGEAL REFLUX DISEASE, UNSPECIFIED WHETHER ESOPHAGITIS PRESENT: ICD-10-CM

## 2024-03-27 RX ORDER — PANTOPRAZOLE SODIUM 20 MG/1
20 TABLET, DELAYED RELEASE ORAL DAILY
Qty: 30 TABLET | Refills: 3 | Status: SHIPPED | OUTPATIENT
Start: 2024-03-27

## 2024-05-14 RX ORDER — ATORVASTATIN CALCIUM 10 MG/1
10 TABLET, FILM COATED ORAL DAILY
Qty: 90 TABLET | Refills: 1 | Status: SHIPPED | OUTPATIENT
Start: 2024-05-14

## 2024-11-09 PROCEDURE — 87086 URINE CULTURE/COLONY COUNT: CPT | Performed by: PHYSICIAN ASSISTANT
